# Patient Record
Sex: FEMALE | Race: WHITE | NOT HISPANIC OR LATINO | Employment: UNEMPLOYED | URBAN - NONMETROPOLITAN AREA
[De-identification: names, ages, dates, MRNs, and addresses within clinical notes are randomized per-mention and may not be internally consistent; named-entity substitution may affect disease eponyms.]

---

## 2017-06-05 ENCOUNTER — APPOINTMENT (OUTPATIENT)
Dept: PREADMISSION TESTING | Facility: HOSPITAL | Age: 72
End: 2017-06-05

## 2017-06-05 RX ORDER — ROPINIROLE 1 MG/1
3 TABLET, FILM COATED ORAL NIGHTLY
COMMUNITY
End: 2017-08-09 | Stop reason: SDUPTHER

## 2017-06-05 RX ORDER — LISINOPRIL 20 MG/1
20 TABLET ORAL DAILY
COMMUNITY

## 2017-06-05 RX ORDER — HYDROCHLOROTHIAZIDE 12.5 MG/1
12.5 CAPSULE, GELATIN COATED ORAL DAILY
COMMUNITY

## 2017-06-06 ENCOUNTER — HOSPITAL ENCOUNTER (INPATIENT)
Facility: HOSPITAL | Age: 72
LOS: 1 days | Discharge: HOME | End: 2017-06-08
Attending: SURGERY | Admitting: SURGERY

## 2017-06-06 ENCOUNTER — ANESTHESIA (OUTPATIENT)
Dept: PERIOP | Facility: HOSPITAL | Age: 72
End: 2017-06-06

## 2017-06-06 ENCOUNTER — ANESTHESIA EVENT (OUTPATIENT)
Dept: PERIOP | Facility: HOSPITAL | Age: 72
End: 2017-06-06

## 2017-06-06 PROBLEM — K42.9 UMBILICAL HERNIA: Status: ACTIVE | Noted: 2017-06-06

## 2017-06-06 PROCEDURE — A9270 NON-COVERED ITEM OR SERVICE: HCPCS | Performed by: SURGERY

## 2017-06-06 PROCEDURE — 25010000002 DEXAMETHASONE PER 1 MG: Performed by: NURSE ANESTHETIST, CERTIFIED REGISTERED

## 2017-06-06 PROCEDURE — 63710000001 ROPINIROLE 1 MG TABLET: Performed by: SURGERY

## 2017-06-06 PROCEDURE — 63710000001 CALCIUM-VITAMIN D 500-200 MG-UNIT TABLET: Performed by: SURGERY

## 2017-06-06 PROCEDURE — 63710000001 HYDROCODONE-ACETAMINOPHEN 7.5-325 MG TABLET: Performed by: SURGERY

## 2017-06-06 PROCEDURE — 25010000002 MIDAZOLAM PER 1 MG: Performed by: NURSE ANESTHETIST, CERTIFIED REGISTERED

## 2017-06-06 PROCEDURE — 63710000001 GABAPENTIN 300 MG CAPSULE: Performed by: SURGERY

## 2017-06-06 PROCEDURE — 25010000002 ONDANSETRON PER 1 MG: Performed by: NURSE ANESTHETIST, CERTIFIED REGISTERED

## 2017-06-06 PROCEDURE — 0WUF4JZ SUPPLEMENT ABDOMINAL WALL WITH SYNTHETIC SUBSTITUTE, PERCUTANEOUS ENDOSCOPIC APPROACH: ICD-10-PCS | Performed by: SURGERY

## 2017-06-06 PROCEDURE — 90732 PPSV23 VACC 2 YRS+ SUBQ/IM: CPT | Performed by: SURGERY

## 2017-06-06 PROCEDURE — C1781 MESH (IMPLANTABLE): HCPCS | Performed by: SURGERY

## 2017-06-06 PROCEDURE — 63710000001 ALPRAZOLAM 0.5 MG TABLET: Performed by: SURGERY

## 2017-06-06 PROCEDURE — 25010000002 PNEUMOCOCCAL VAC POLYVALENT PER 0.5 ML: Performed by: SURGERY

## 2017-06-06 PROCEDURE — 25010000002 PROPOFOL 10 MG/ML EMULSION: Performed by: NURSE ANESTHETIST, CERTIFIED REGISTERED

## 2017-06-06 PROCEDURE — 25010000002 NEOSTIGMINE 10 MG/10ML SOLUTION: Performed by: NURSE ANESTHETIST, CERTIFIED REGISTERED

## 2017-06-06 PROCEDURE — 63710000001 PANTOPRAZOLE 40 MG TABLET DELAYED-RELEASE: Performed by: SURGERY

## 2017-06-06 PROCEDURE — 63710000001 LISINOPRIL 10 MG TABLET: Performed by: SURGERY

## 2017-06-06 PROCEDURE — 25010000002 FENTANYL CITRATE (PF) 100 MCG/2ML SOLUTION: Performed by: NURSE ANESTHETIST, CERTIFIED REGISTERED

## 2017-06-06 PROCEDURE — 63710000001 VITAMIN B-12 1000 MCG TABLET: Performed by: SURGERY

## 2017-06-06 PROCEDURE — G0009 ADMIN PNEUMOCOCCAL VACCINE: HCPCS | Performed by: SURGERY

## 2017-06-06 PROCEDURE — 25010000002 MEPERIDINE PER 100 MG: Performed by: NURSE ANESTHETIST, CERTIFIED REGISTERED

## 2017-06-06 PROCEDURE — 63710000001 HYDROCHLOROTHIAZIDE 12.5 MG CAPSULE: Performed by: SURGERY

## 2017-06-06 DEVICE — VENTRALIGHT ST MESH
Type: IMPLANTABLE DEVICE | Status: FUNCTIONAL
Brand: VENTRALIGHT ST

## 2017-06-06 RX ORDER — BUPIVACAINE HYDROCHLORIDE AND EPINEPHRINE 5; 5 MG/ML; UG/ML
INJECTION, SOLUTION PERINEURAL AS NEEDED
Status: DISCONTINUED | OUTPATIENT
Start: 2017-06-06 | End: 2017-06-06 | Stop reason: HOSPADM

## 2017-06-06 RX ORDER — OXYCODONE HYDROCHLORIDE AND ACETAMINOPHEN 5; 325 MG/1; MG/1
1 TABLET ORAL ONCE AS NEEDED
Status: DISCONTINUED | OUTPATIENT
Start: 2017-06-06 | End: 2017-06-06 | Stop reason: HOSPADM

## 2017-06-06 RX ORDER — ONDANSETRON 4 MG/1
4 TABLET, FILM COATED ORAL EVERY 6 HOURS PRN
Status: DISCONTINUED | OUTPATIENT
Start: 2017-06-06 | End: 2017-06-08 | Stop reason: HOSPADM

## 2017-06-06 RX ORDER — MIDAZOLAM HYDROCHLORIDE 1 MG/ML
1 INJECTION INTRAMUSCULAR; INTRAVENOUS
Status: DISCONTINUED | OUTPATIENT
Start: 2017-06-06 | End: 2017-06-06 | Stop reason: HOSPADM

## 2017-06-06 RX ORDER — PRAVASTATIN SODIUM 20 MG
40 TABLET ORAL EVERY OTHER DAY
Status: DISCONTINUED | OUTPATIENT
Start: 2017-06-07 | End: 2017-06-07 | Stop reason: CLARIF

## 2017-06-06 RX ORDER — HYDROCHLOROTHIAZIDE 12.5 MG/1
12.5 CAPSULE, GELATIN COATED ORAL DAILY
Status: DISCONTINUED | OUTPATIENT
Start: 2017-06-06 | End: 2017-06-08 | Stop reason: HOSPADM

## 2017-06-06 RX ORDER — SODIUM CHLORIDE 0.9 % (FLUSH) 0.9 %
1-10 SYRINGE (ML) INJECTION AS NEEDED
Status: DISCONTINUED | OUTPATIENT
Start: 2017-06-06 | End: 2017-06-06 | Stop reason: HOSPADM

## 2017-06-06 RX ORDER — SODIUM CHLORIDE 9 MG/ML
INJECTION, SOLUTION INTRAVENOUS AS NEEDED
Status: DISCONTINUED | OUTPATIENT
Start: 2017-06-06 | End: 2017-06-06 | Stop reason: HOSPADM

## 2017-06-06 RX ORDER — MEPERIDINE HYDROCHLORIDE 25 MG/ML
12.5 INJECTION INTRAMUSCULAR; INTRAVENOUS; SUBCUTANEOUS
Status: DISCONTINUED | OUTPATIENT
Start: 2017-06-06 | End: 2017-06-06 | Stop reason: HOSPADM

## 2017-06-06 RX ORDER — FENTANYL CITRATE 50 UG/ML
50 INJECTION, SOLUTION INTRAMUSCULAR; INTRAVENOUS
Status: DISCONTINUED | OUTPATIENT
Start: 2017-06-06 | End: 2017-06-06 | Stop reason: HOSPADM

## 2017-06-06 RX ORDER — HYDROCODONE BITARTRATE AND ACETAMINOPHEN 10; 325 MG/1; MG/1
1 TABLET ORAL EVERY 6 HOURS PRN
Status: DISCONTINUED | OUTPATIENT
Start: 2017-06-06 | End: 2017-06-06 | Stop reason: SDUPTHER

## 2017-06-06 RX ORDER — LIDOCAINE HYDROCHLORIDE 20 MG/ML
INJECTION, SOLUTION INFILTRATION; PERINEURAL AS NEEDED
Status: DISCONTINUED | OUTPATIENT
Start: 2017-06-06 | End: 2017-06-06 | Stop reason: SURG

## 2017-06-06 RX ORDER — FENTANYL CITRATE 50 UG/ML
INJECTION, SOLUTION INTRAMUSCULAR; INTRAVENOUS AS NEEDED
Status: DISCONTINUED | OUTPATIENT
Start: 2017-06-06 | End: 2017-06-06 | Stop reason: SURG

## 2017-06-06 RX ORDER — CHOLECALCIFEROL (VITAMIN D3) 125 MCG
500 CAPSULE ORAL 2 TIMES DAILY
COMMUNITY

## 2017-06-06 RX ORDER — LISINOPRIL 10 MG/1
20 TABLET ORAL DAILY
Status: DISCONTINUED | OUTPATIENT
Start: 2017-06-06 | End: 2017-06-08 | Stop reason: HOSPADM

## 2017-06-06 RX ORDER — ONDANSETRON 2 MG/ML
4 INJECTION INTRAMUSCULAR; INTRAVENOUS EVERY 6 HOURS PRN
Status: DISCONTINUED | OUTPATIENT
Start: 2017-06-06 | End: 2017-06-08 | Stop reason: HOSPADM

## 2017-06-06 RX ORDER — FAMOTIDINE 20 MG/1
20 TABLET, FILM COATED ORAL 2 TIMES DAILY
Status: DISCONTINUED | OUTPATIENT
Start: 2017-06-06 | End: 2017-06-06 | Stop reason: SDUPTHER

## 2017-06-06 RX ORDER — PANTOPRAZOLE SODIUM 40 MG/1
40 TABLET, DELAYED RELEASE ORAL DAILY
Status: DISCONTINUED | OUTPATIENT
Start: 2017-06-06 | End: 2017-06-08 | Stop reason: HOSPADM

## 2017-06-06 RX ORDER — NEOSTIGMINE METHYLSULFATE 1 MG/ML
INJECTION, SOLUTION INTRAVENOUS AS NEEDED
Status: DISCONTINUED | OUTPATIENT
Start: 2017-06-06 | End: 2017-06-06 | Stop reason: SURG

## 2017-06-06 RX ORDER — ONDANSETRON 4 MG/1
4 TABLET, ORALLY DISINTEGRATING ORAL EVERY 6 HOURS PRN
Status: DISCONTINUED | OUTPATIENT
Start: 2017-06-06 | End: 2017-06-08 | Stop reason: HOSPADM

## 2017-06-06 RX ORDER — GABAPENTIN 300 MG/1
600 CAPSULE ORAL NIGHTLY
Status: DISCONTINUED | OUTPATIENT
Start: 2017-06-06 | End: 2017-06-08 | Stop reason: HOSPADM

## 2017-06-06 RX ORDER — HYDROCODONE BITARTRATE AND ACETAMINOPHEN 7.5; 325 MG/1; MG/1
1 TABLET ORAL EVERY 4 HOURS PRN
Status: DISCONTINUED | OUTPATIENT
Start: 2017-06-06 | End: 2017-06-08 | Stop reason: HOSPADM

## 2017-06-06 RX ORDER — DEXAMETHASONE SODIUM PHOSPHATE 4 MG/ML
INJECTION, SOLUTION INTRA-ARTICULAR; INTRALESIONAL; INTRAMUSCULAR; INTRAVENOUS; SOFT TISSUE AS NEEDED
Status: DISCONTINUED | OUTPATIENT
Start: 2017-06-06 | End: 2017-06-06 | Stop reason: SURG

## 2017-06-06 RX ORDER — ONDANSETRON 2 MG/ML
4 INJECTION INTRAMUSCULAR; INTRAVENOUS ONCE AS NEEDED
Status: DISCONTINUED | OUTPATIENT
Start: 2017-06-06 | End: 2017-06-06 | Stop reason: HOSPADM

## 2017-06-06 RX ORDER — ROPINIROLE 1 MG/1
3 TABLET, FILM COATED ORAL NIGHTLY
Status: DISCONTINUED | OUTPATIENT
Start: 2017-06-06 | End: 2017-06-08 | Stop reason: HOSPADM

## 2017-06-06 RX ORDER — FAMOTIDINE 10 MG/ML
INJECTION, SOLUTION INTRAVENOUS AS NEEDED
Status: DISCONTINUED | OUTPATIENT
Start: 2017-06-06 | End: 2017-06-06 | Stop reason: SURG

## 2017-06-06 RX ORDER — NALOXONE HCL 0.4 MG/ML
0.1 VIAL (ML) INJECTION
Status: DISCONTINUED | OUTPATIENT
Start: 2017-06-06 | End: 2017-06-08 | Stop reason: HOSPADM

## 2017-06-06 RX ORDER — SODIUM CHLORIDE 9 MG/ML
100 INJECTION, SOLUTION INTRAVENOUS CONTINUOUS
Status: DISCONTINUED | OUTPATIENT
Start: 2017-06-06 | End: 2017-06-08 | Stop reason: HOSPADM

## 2017-06-06 RX ORDER — PRAVASTATIN SODIUM 40 MG
40 TABLET ORAL EVERY OTHER DAY
COMMUNITY

## 2017-06-06 RX ORDER — HYDROMORPHONE HYDROCHLORIDE 1 MG/ML
0.5 INJECTION, SOLUTION INTRAMUSCULAR; INTRAVENOUS; SUBCUTANEOUS
Status: DISCONTINUED | OUTPATIENT
Start: 2017-06-06 | End: 2017-06-08 | Stop reason: HOSPADM

## 2017-06-06 RX ORDER — GABAPENTIN 300 MG/1
300 CAPSULE ORAL EVERY MORNING
Status: DISCONTINUED | OUTPATIENT
Start: 2017-06-06 | End: 2017-06-08 | Stop reason: HOSPADM

## 2017-06-06 RX ORDER — MIDAZOLAM HYDROCHLORIDE 1 MG/ML
INJECTION INTRAMUSCULAR; INTRAVENOUS AS NEEDED
Status: DISCONTINUED | OUTPATIENT
Start: 2017-06-06 | End: 2017-06-06 | Stop reason: SURG

## 2017-06-06 RX ORDER — MIDAZOLAM HYDROCHLORIDE 1 MG/ML
2 INJECTION INTRAMUSCULAR; INTRAVENOUS
Status: DISCONTINUED | OUTPATIENT
Start: 2017-06-06 | End: 2017-06-06 | Stop reason: HOSPADM

## 2017-06-06 RX ORDER — CHLORAL HYDRATE 500 MG
1000 CAPSULE ORAL 2 TIMES DAILY WITH MEALS
COMMUNITY

## 2017-06-06 RX ORDER — ALPRAZOLAM 0.5 MG/1
0.5 TABLET ORAL NIGHTLY PRN
Status: DISCONTINUED | OUTPATIENT
Start: 2017-06-06 | End: 2017-06-08 | Stop reason: HOSPADM

## 2017-06-06 RX ORDER — ROCURONIUM BROMIDE 10 MG/ML
INJECTION, SOLUTION INTRAVENOUS AS NEEDED
Status: DISCONTINUED | OUTPATIENT
Start: 2017-06-06 | End: 2017-06-06 | Stop reason: SURG

## 2017-06-06 RX ORDER — SODIUM CHLORIDE, SODIUM LACTATE, POTASSIUM CHLORIDE, CALCIUM CHLORIDE 600; 310; 30; 20 MG/100ML; MG/100ML; MG/100ML; MG/100ML
125 INJECTION, SOLUTION INTRAVENOUS CONTINUOUS
Status: DISCONTINUED | OUTPATIENT
Start: 2017-06-06 | End: 2017-06-06

## 2017-06-06 RX ORDER — IPRATROPIUM BROMIDE AND ALBUTEROL SULFATE 2.5; .5 MG/3ML; MG/3ML
3 SOLUTION RESPIRATORY (INHALATION) ONCE AS NEEDED
Status: DISCONTINUED | OUTPATIENT
Start: 2017-06-06 | End: 2017-06-06 | Stop reason: HOSPADM

## 2017-06-06 RX ORDER — ROPINIROLE 1 MG/1
2 TABLET, FILM COATED ORAL EVERY MORNING
Status: DISCONTINUED | OUTPATIENT
Start: 2017-06-07 | End: 2017-06-08 | Stop reason: HOSPADM

## 2017-06-06 RX ORDER — ONDANSETRON 2 MG/ML
INJECTION INTRAMUSCULAR; INTRAVENOUS AS NEEDED
Status: DISCONTINUED | OUTPATIENT
Start: 2017-06-06 | End: 2017-06-06 | Stop reason: SURG

## 2017-06-06 RX ORDER — LANOLIN ALCOHOL/MO/W.PET/CERES
500 CREAM (GRAM) TOPICAL
Status: DISCONTINUED | OUTPATIENT
Start: 2017-06-06 | End: 2017-06-08 | Stop reason: HOSPADM

## 2017-06-06 RX ORDER — LANOLIN ALCOHOL/MO/W.PET/CERES
1000 CREAM (GRAM) TOPICAL 2 TIMES DAILY
Status: ON HOLD | COMMUNITY
End: 2017-06-06

## 2017-06-06 RX ORDER — GLYCOPYRROLATE 0.2 MG/ML
INJECTION INTRAMUSCULAR; INTRAVENOUS AS NEEDED
Status: DISCONTINUED | OUTPATIENT
Start: 2017-06-06 | End: 2017-06-06 | Stop reason: SURG

## 2017-06-06 RX ORDER — HYDROCODONE BITARTRATE AND ACETAMINOPHEN 7.5; 325 MG/1; MG/1
1 TABLET ORAL EVERY 6 HOURS PRN
Qty: 24 TABLET | Refills: 0 | Status: SHIPPED | OUTPATIENT
Start: 2017-06-06 | End: 2017-06-08

## 2017-06-06 RX ORDER — GABAPENTIN 300 MG/1
300 CAPSULE ORAL EVERY MORNING
COMMUNITY
End: 2017-08-09 | Stop reason: SDUPTHER

## 2017-06-06 RX ORDER — PROPOFOL 10 MG/ML
VIAL (ML) INTRAVENOUS AS NEEDED
Status: DISCONTINUED | OUTPATIENT
Start: 2017-06-06 | End: 2017-06-06 | Stop reason: SURG

## 2017-06-06 RX ORDER — HEPARIN SODIUM 5000 [USP'U]/ML
5000 INJECTION, SOLUTION INTRAVENOUS; SUBCUTANEOUS EVERY 8 HOURS SCHEDULED
Status: DISCONTINUED | OUTPATIENT
Start: 2017-06-07 | End: 2017-06-08 | Stop reason: HOSPADM

## 2017-06-06 RX ADMIN — SODIUM CHLORIDE, POTASSIUM CHLORIDE, SODIUM LACTATE AND CALCIUM CHLORIDE 125 ML/HR: 600; 310; 30; 20 INJECTION, SOLUTION INTRAVENOUS at 08:11

## 2017-06-06 RX ADMIN — LISINOPRIL 20 MG: 10 TABLET ORAL at 14:41

## 2017-06-06 RX ADMIN — ALPRAZOLAM 0.5 MG: 0.5 TABLET ORAL at 21:51

## 2017-06-06 RX ADMIN — MIDAZOLAM HYDROCHLORIDE 2 MG: 1 INJECTION, SOLUTION INTRAMUSCULAR; INTRAVENOUS at 08:53

## 2017-06-06 RX ADMIN — FENTANYL CITRATE 100 MCG: 50 INJECTION INTRAMUSCULAR; INTRAVENOUS at 09:18

## 2017-06-06 RX ADMIN — CYANOCOBALAMIN TAB 1000 MCG 500 MCG: 1000 TAB at 14:40

## 2017-06-06 RX ADMIN — ROPINIROLE HYDROCHLORIDE 3 MG: 1 TABLET, FILM COATED ORAL at 20:21

## 2017-06-06 RX ADMIN — HYDROCHLOROTHIAZIDE 12.5 MG: 12.5 CAPSULE, GELATIN COATED ORAL at 14:41

## 2017-06-06 RX ADMIN — GABAPENTIN 600 MG: 300 CAPSULE ORAL at 20:21

## 2017-06-06 RX ADMIN — FENTANYL CITRATE 50 MCG: 50 INJECTION, SOLUTION INTRAMUSCULAR; INTRAVENOUS at 10:02

## 2017-06-06 RX ADMIN — CEFAZOLIN 1 G: 1 INJECTION, POWDER, FOR SOLUTION INTRAMUSCULAR; INTRAVENOUS; PARENTERAL at 09:07

## 2017-06-06 RX ADMIN — FENTANYL CITRATE 50 MCG: 50 INJECTION INTRAMUSCULAR; INTRAVENOUS at 09:37

## 2017-06-06 RX ADMIN — CALCIUM CARBONATE-VITAMIN D TAB 500 MG-200 UNIT 500 MG: 500-200 TAB at 14:39

## 2017-06-06 RX ADMIN — GABAPENTIN 300 MG: 300 CAPSULE ORAL at 14:42

## 2017-06-06 RX ADMIN — ROCURONIUM BROMIDE 25 MG: 10 INJECTION INTRAVENOUS at 08:58

## 2017-06-06 RX ADMIN — ROCURONIUM BROMIDE 5 MG: 10 INJECTION INTRAVENOUS at 08:57

## 2017-06-06 RX ADMIN — ONDANSETRON 4 MG: 2 INJECTION, SOLUTION INTRAMUSCULAR; INTRAVENOUS at 08:53

## 2017-06-06 RX ADMIN — DEXAMETHASONE SODIUM PHOSPHATE 4 MG: 4 INJECTION, SOLUTION INTRAMUSCULAR; INTRAVENOUS at 08:53

## 2017-06-06 RX ADMIN — HYDROCODONE BITARTRATE AND ACETAMINOPHEN 1 TABLET: 7.5; 325 TABLET ORAL at 14:53

## 2017-06-06 RX ADMIN — LIDOCAINE HYDROCHLORIDE 60 MG: 20 INJECTION, SOLUTION INFILTRATION; PERINEURAL at 08:57

## 2017-06-06 RX ADMIN — FAMOTIDINE 20 MG: 10 INJECTION, SOLUTION INTRAVENOUS at 08:53

## 2017-06-06 RX ADMIN — NEOSTIGMINE METHYLSULFATE 5 MG: 1 INJECTION, SOLUTION INTRAVENOUS at 09:33

## 2017-06-06 RX ADMIN — PROPOFOL 150 MG: 10 INJECTION, EMULSION INTRAVENOUS at 08:57

## 2017-06-06 RX ADMIN — HYDROCODONE BITARTRATE AND ACETAMINOPHEN 1 TABLET: 7.5; 325 TABLET ORAL at 21:51

## 2017-06-06 RX ADMIN — PANTOPRAZOLE SODIUM 40 MG: 40 TABLET, DELAYED RELEASE ORAL at 14:41

## 2017-06-06 RX ADMIN — FENTANYL CITRATE 50 MCG: 50 INJECTION, SOLUTION INTRAMUSCULAR; INTRAVENOUS at 09:56

## 2017-06-06 RX ADMIN — MEPERIDINE HYDROCHLORIDE 12.5 MG: 25 INJECTION, SOLUTION INTRAMUSCULAR; INTRAVENOUS; SUBCUTANEOUS at 10:06

## 2017-06-06 RX ADMIN — FENTANYL CITRATE 100 MCG: 50 INJECTION INTRAMUSCULAR; INTRAVENOUS at 08:57

## 2017-06-06 RX ADMIN — GLYCOPYRROLATE 0.8 MG: 0.2 INJECTION, SOLUTION INTRAMUSCULAR; INTRAVENOUS at 09:33

## 2017-06-06 RX ADMIN — SODIUM CHLORIDE 100 ML/HR: 900 INJECTION, SOLUTION INTRAVENOUS at 12:38

## 2017-06-06 RX ADMIN — PNEUMOCOCCAL VACCINE POLYVALENT 0.5 ML
25; 25; 25; 25; 25; 25; 25; 25; 25; 25; 25; 25; 25; 25; 25; 25; 25; 25; 25; 25; 25; 25; 25 INJECTION, SOLUTION INTRAMUSCULAR; SUBCUTANEOUS at 17:30

## 2017-06-06 RX ADMIN — SODIUM CHLORIDE 100 ML/HR: 900 INJECTION, SOLUTION INTRAVENOUS at 21:55

## 2017-06-06 NOTE — ANESTHESIA PROCEDURE NOTES
Airway  Urgency: elective    Airway not difficult    General Information and Staff    Patient location during procedure: OR  CRNA: YARON VEGA    Indications and Patient Condition  Indications for airway management: airway protection    Preoxygenated: yes  MILS maintained throughout  Mask difficulty assessment: 1 - vent by mask    Final Airway Details  Final airway type: endotracheal airway      Successful airway: ETT  Cuffed: yes   Successful intubation technique: direct laryngoscopy  Facilitating devices/methods: intubating stylet  Endotracheal tube insertion site: oral  Blade: Hannah  Blade size: #3  ETT size: 7.0 mm  Cormack-Lehane Classification: grade I - full view of glottis  Placement verified by: chest auscultation and capnometry   Measured from: lips  ETT to lips (cm): 20  Number of attempts at approach: 1    Additional Comments  ETT inserted without difficulty.  Head and neck maintained midline.  LIps and gums as per preop.

## 2017-06-06 NOTE — ANESTHESIA POSTPROCEDURE EVALUATION
Patient: Layne Case    Procedure Summary     Date Anesthesia Start Anesthesia Stop Room / Location    06/06/17 0853 0947  [facility] OR 01 / [facility]       Procedure Diagnosis Surgeon Provider    VENTRAL/INCISIONAL HERNIA REPAIR LAPAROSCOPIC WITH PATCH (N/A Abdomen) (K43.9) MD Tanmay Rangel MD          Anesthesia Type: general  Last vitals  BP      Temp      Pulse     Resp      SpO2        Post Anesthesia Care and Evaluation    Patient location during evaluation: PACU  Patient participation: complete - patient participated  Level of consciousness: awake and alert  Pain score: 1  Pain management: adequate  Airway patency: patent  Anesthetic complications: No anesthetic complications  PONV Status: controlled  Cardiovascular status: acceptable  Respiratory status: acceptable  Hydration status: acceptable

## 2017-06-06 NOTE — ANESTHESIA PREPROCEDURE EVALUATION
Anesthesia Evaluation     NPO Solid Status: > 8 hours  NPO Liquid Status: > 8 hours     Airway   Mallampati: II  TM distance: >3 FB  Neck ROM: full  no difficulty expected  Dental    (+) lower dentures and upper dentures    Pulmonary - normal exam   Cardiovascular - normal exam    (+) hypertension,       Neuro/Psych  GI/Hepatic/Renal/Endo      Musculoskeletal     Abdominal  - normal exam   Substance History      OB/GYN          Other                                        Anesthesia Plan    ASA 2     general     intravenous induction   Anesthetic plan and risks discussed with patient.

## 2017-06-07 PROBLEM — K42.9 UMBILICAL HERNIA WITHOUT OBSTRUCTION OR GANGRENE: Status: ACTIVE | Noted: 2017-06-07

## 2017-06-07 LAB
ANION GAP SERPL CALCULATED.3IONS-SCNC: 1.3 MMOL/L (ref 3.6–11.2)
BASOPHILS # BLD AUTO: 0.01 10*3/MM3 (ref 0–0.3)
BASOPHILS NFR BLD AUTO: 0.1 % (ref 0–2)
BUN BLD-MCNC: 10 MG/DL (ref 7–21)
BUN/CREAT SERPL: 11.6 (ref 7–25)
CALCIUM SPEC-SCNC: 8.8 MG/DL (ref 7.7–10)
CHLORIDE SERPL-SCNC: 111 MMOL/L (ref 99–112)
CO2 SERPL-SCNC: 26.7 MMOL/L (ref 24.3–31.9)
CREAT BLD-MCNC: 0.86 MG/DL (ref 0.43–1.29)
DEPRECATED RDW RBC AUTO: 47.9 FL (ref 37–54)
EOSINOPHIL # BLD AUTO: 0.01 10*3/MM3 (ref 0–0.7)
EOSINOPHIL NFR BLD AUTO: 0.1 % (ref 0–7)
ERYTHROCYTE [DISTWIDTH] IN BLOOD BY AUTOMATED COUNT: 15.6 % (ref 11.5–14.5)
GFR SERPL CREATININE-BSD FRML MDRD: 65 ML/MIN/1.73
GLUCOSE BLD-MCNC: 107 MG/DL (ref 70–110)
HCT VFR BLD AUTO: 29.9 % (ref 37–47)
HGB BLD-MCNC: 8.8 G/DL (ref 12–16)
IMM GRANULOCYTES # BLD: 0.01 10*3/MM3 (ref 0–0.03)
IMM GRANULOCYTES NFR BLD: 0.1 % (ref 0–0.5)
LYMPHOCYTES # BLD AUTO: 1.03 10*3/MM3 (ref 1–3)
LYMPHOCYTES NFR BLD AUTO: 13.7 % (ref 16–46)
MCH RBC QN AUTO: 25 PG (ref 27–33)
MCHC RBC AUTO-ENTMCNC: 29.4 G/DL (ref 33–37)
MCV RBC AUTO: 84.9 FL (ref 80–94)
MONOCYTES # BLD AUTO: 0.53 10*3/MM3 (ref 0.1–0.9)
MONOCYTES NFR BLD AUTO: 7 % (ref 0–12)
NEUTROPHILS # BLD AUTO: 5.93 10*3/MM3 (ref 1.4–6.5)
NEUTROPHILS NFR BLD AUTO: 79 % (ref 40–75)
OSMOLALITY SERPL CALC.SUM OF ELEC: 277.1 MOSM/KG (ref 273–305)
PLATELET # BLD AUTO: 250 10*3/MM3 (ref 130–400)
PMV BLD AUTO: 10.3 FL (ref 6–10)
POTASSIUM BLD-SCNC: 3.7 MMOL/L (ref 3.5–5.3)
RBC # BLD AUTO: 3.52 10*6/MM3 (ref 4.2–5.4)
SODIUM BLD-SCNC: 139 MMOL/L (ref 135–153)
WBC NRBC COR # BLD: 7.52 10*3/MM3 (ref 4.5–12.5)

## 2017-06-07 PROCEDURE — 63710000001 GABAPENTIN 300 MG CAPSULE: Performed by: SURGERY

## 2017-06-07 PROCEDURE — 63710000001 HYDROCODONE-ACETAMINOPHEN 7.5-325 MG TABLET: Performed by: SURGERY

## 2017-06-07 PROCEDURE — A9270 NON-COVERED ITEM OR SERVICE: HCPCS | Performed by: SURGERY

## 2017-06-07 PROCEDURE — 63710000001 PANTOPRAZOLE 40 MG TABLET DELAYED-RELEASE: Performed by: SURGERY

## 2017-06-07 PROCEDURE — 80048 BASIC METABOLIC PNL TOTAL CA: CPT | Performed by: SURGERY

## 2017-06-07 PROCEDURE — 63710000001 CALCIUM-VITAMIN D 500-200 MG-UNIT TABLET: Performed by: SURGERY

## 2017-06-07 PROCEDURE — 63710000001 PRAVASTATIN 20 MG TABLET: Performed by: SURGERY

## 2017-06-07 PROCEDURE — 85025 COMPLETE CBC W/AUTO DIFF WBC: CPT | Performed by: SURGERY

## 2017-06-07 PROCEDURE — 63710000001 ROPINIROLE 1 MG TABLET: Performed by: SURGERY

## 2017-06-07 PROCEDURE — 63710000001 VITAMIN B-12 1000 MCG TABLET: Performed by: SURGERY

## 2017-06-07 PROCEDURE — 25010000002 HEPARIN (PORCINE) PER 1000 UNITS: Performed by: SURGERY

## 2017-06-07 RX ORDER — ATORVASTATIN CALCIUM 10 MG/1
10 TABLET, FILM COATED ORAL EVERY OTHER DAY
Status: DISCONTINUED | OUTPATIENT
Start: 2017-06-09 | End: 2017-06-08 | Stop reason: HOSPADM

## 2017-06-07 RX ADMIN — HYDROCODONE BITARTRATE AND ACETAMINOPHEN 1 TABLET: 7.5; 325 TABLET ORAL at 12:08

## 2017-06-07 RX ADMIN — GABAPENTIN 600 MG: 300 CAPSULE ORAL at 20:19

## 2017-06-07 RX ADMIN — SODIUM CHLORIDE 100 ML/HR: 900 INJECTION, SOLUTION INTRAVENOUS at 08:09

## 2017-06-07 RX ADMIN — CYANOCOBALAMIN TAB 1000 MCG 500 MCG: 1000 TAB at 12:03

## 2017-06-07 RX ADMIN — HEPARIN SODIUM 5000 UNITS: 5000 INJECTION, SOLUTION INTRAVENOUS; SUBCUTANEOUS at 16:47

## 2017-06-07 RX ADMIN — ROPINIROLE HYDROCHLORIDE 2 MG: 1 TABLET, FILM COATED ORAL at 06:01

## 2017-06-07 RX ADMIN — CALCIUM CARBONATE-VITAMIN D TAB 500 MG-200 UNIT 500 MG: 500-200 TAB at 08:09

## 2017-06-07 RX ADMIN — PANTOPRAZOLE SODIUM 40 MG: 40 TABLET, DELAYED RELEASE ORAL at 08:09

## 2017-06-07 RX ADMIN — ALPRAZOLAM 0.5 MG: 0.5 TABLET ORAL at 22:52

## 2017-06-07 RX ADMIN — GABAPENTIN 300 MG: 300 CAPSULE ORAL at 06:01

## 2017-06-07 RX ADMIN — HEPARIN SODIUM 5000 UNITS: 5000 INJECTION, SOLUTION INTRAVENOUS; SUBCUTANEOUS at 22:52

## 2017-06-07 RX ADMIN — ROPINIROLE HYDROCHLORIDE 3 MG: 1 TABLET, FILM COATED ORAL at 20:19

## 2017-06-07 RX ADMIN — CALCIUM CARBONATE-VITAMIN D TAB 500 MG-200 UNIT 500 MG: 500-200 TAB at 18:36

## 2017-06-07 RX ADMIN — PRAVASTATIN SODIUM 40 MG: 20 TABLET ORAL at 08:08

## 2017-06-07 RX ADMIN — HEPARIN SODIUM 5000 UNITS: 5000 INJECTION, SOLUTION INTRAVENOUS; SUBCUTANEOUS at 06:01

## 2017-06-07 RX ADMIN — SODIUM CHLORIDE 100 ML/HR: 900 INJECTION, SOLUTION INTRAVENOUS at 18:36

## 2017-06-08 VITALS
TEMPERATURE: 98.1 F | BODY MASS INDEX: 35.44 KG/M2 | HEIGHT: 63 IN | HEART RATE: 76 BPM | SYSTOLIC BLOOD PRESSURE: 141 MMHG | DIASTOLIC BLOOD PRESSURE: 69 MMHG | OXYGEN SATURATION: 93 % | WEIGHT: 200 LBS | RESPIRATION RATE: 20 BRPM

## 2017-06-08 PROCEDURE — 25010000002 HEPARIN (PORCINE) PER 1000 UNITS: Performed by: SURGERY

## 2017-06-08 RX ADMIN — PANTOPRAZOLE SODIUM 40 MG: 40 TABLET, DELAYED RELEASE ORAL at 08:18

## 2017-06-08 RX ADMIN — HEPARIN SODIUM 5000 UNITS: 5000 INJECTION, SOLUTION INTRAVENOUS; SUBCUTANEOUS at 06:22

## 2017-06-08 RX ADMIN — HYDROCHLOROTHIAZIDE 12.5 MG: 12.5 CAPSULE, GELATIN COATED ORAL at 08:18

## 2017-06-08 RX ADMIN — ROPINIROLE HYDROCHLORIDE 2 MG: 1 TABLET, FILM COATED ORAL at 06:22

## 2017-06-08 RX ADMIN — CALCIUM CARBONATE-VITAMIN D TAB 500 MG-200 UNIT 500 MG: 500-200 TAB at 08:18

## 2017-06-08 RX ADMIN — HYDROCODONE BITARTRATE AND ACETAMINOPHEN 1 TABLET: 7.5; 325 TABLET ORAL at 08:21

## 2017-06-08 RX ADMIN — SODIUM CHLORIDE 100 ML/HR: 900 INJECTION, SOLUTION INTRAVENOUS at 04:50

## 2017-06-08 RX ADMIN — GABAPENTIN 300 MG: 300 CAPSULE ORAL at 06:22

## 2017-06-08 RX ADMIN — LISINOPRIL 20 MG: 10 TABLET ORAL at 08:18

## 2017-07-07 ENCOUNTER — TRANSCRIBE ORDERS (OUTPATIENT)
Dept: ADMINISTRATIVE | Facility: HOSPITAL | Age: 72
End: 2017-07-07

## 2017-07-11 DIAGNOSIS — R92.8 ABNORMAL MAMMOGRAM: Primary | ICD-10-CM

## 2017-08-02 ENCOUNTER — HOSPITAL ENCOUNTER (OUTPATIENT)
Dept: ULTRASOUND IMAGING | Facility: HOSPITAL | Age: 72
Discharge: HOME | End: 2017-08-02

## 2017-08-02 ENCOUNTER — HOSPITAL ENCOUNTER (OUTPATIENT)
Dept: MAMMOGRAPHY | Facility: HOSPITAL | Age: 72
Discharge: HOME | End: 2017-08-02
Attending: SURGERY | Admitting: SURGERY

## 2017-08-02 DIAGNOSIS — R92.8 ABNORMAL MAMMOGRAM OF LEFT BREAST: ICD-10-CM

## 2017-08-02 DIAGNOSIS — R92.8 ABNORMAL MAMMOGRAM: ICD-10-CM

## 2017-08-02 PROCEDURE — G0204 DX MAMMO INCL CAD BI: HCPCS | Performed by: RADIOLOGY

## 2017-08-02 PROCEDURE — G0204 DX MAMMO INCL CAD BI: HCPCS

## 2017-08-02 PROCEDURE — G0279 TOMOSYNTHESIS, MAMMO: HCPCS

## 2017-08-02 PROCEDURE — 76882 US LMTD JT/FCL EVL NVASC XTR: CPT

## 2017-08-02 PROCEDURE — G0279 TOMOSYNTHESIS, MAMMO: HCPCS | Performed by: RADIOLOGY

## 2017-08-02 PROCEDURE — 76882 US LMTD JT/FCL EVL NVASC XTR: CPT | Performed by: RADIOLOGY

## 2017-08-09 ENCOUNTER — OFFICE VISIT (OUTPATIENT)
Dept: SURGERY | Facility: CLINIC | Age: 72
End: 2017-08-09

## 2017-08-09 VITALS
HEART RATE: 73 BPM | SYSTOLIC BLOOD PRESSURE: 142 MMHG | BODY MASS INDEX: 36.32 KG/M2 | HEIGHT: 63 IN | WEIGHT: 205 LBS | DIASTOLIC BLOOD PRESSURE: 78 MMHG

## 2017-08-09 DIAGNOSIS — R92.8 ABNORMAL MAMMOGRAM: Primary | ICD-10-CM

## 2017-08-09 PROCEDURE — 99213 OFFICE O/P EST LOW 20 MIN: CPT | Performed by: SURGERY

## 2017-08-09 NOTE — PROGRESS NOTES
Subjective   Layne Case is a 71 y.o. female is here today for follow-up breast care and review of mammogram.    History of Present Illness  Ms. Case was seen in the office today for follow-up of a left breast asymmetric density.  She also has a history of left breast pain which she states is resolved.  The patient had a bilateral mammogram and a left breast ultrasound on 8/2/17 which demonstrated stable findings with no evidence of malignancy.  The patient denies a lump in her breast.  There is no change in her family history for breast or ovarian cancer.  She denies a history of nipple discharge.  Allergies   Allergen Reactions   • Azithromycin Swelling         Current Outpatient Prescriptions   Medication Sig Dispense Refill   • ALPRAZolam (XANAX) 0.5 MG tablet Take 0.5 mg by mouth At Night As Needed.     • calcium carbonate-vitamin d 600-400 MG-UNIT per tablet Take 1 tablet by mouth 2 (Two) Times a Day.     • gabapentin (NEURONTIN) 300 MG capsule Take 600 mg by mouth Every Night. Take 1 pill in the morning and takes 2 tablets at night     • hydrochlorothiazide (MICROZIDE) 12.5 MG capsule Take 12.5 mg by mouth Daily.     • HYDROcodone-acetaminophen (NORCO)  MG per tablet Take 1 tablet by mouth Every 6 (Six) Hours As Needed.     • lisinopril (PRINIVIL,ZESTRIL) 20 MG tablet Take 20 mg by mouth Daily.     • Omega-3 Fatty Acids (FISH OIL) 1000 MG capsule capsule Take 1,000 mg by mouth 2 (Two) Times a Day With Meals.     • pantoprazole (PROTONIX) 40 MG EC tablet Take 40 mg by mouth Daily.     • pravastatin (PRAVACHOL) 40 MG tablet Take 40 mg by mouth Every Other Day.     • ranitidine (ZANTAC) 150 MG tablet Take 150 mg by mouth 2 (Two) Times a Day.     • rOPINIRole (REQUIP) 1 MG tablet Take 2 mg by mouth Every Morning.     • vitamin B-12 (CYANOCOBALAMIN) 500 MCG tablet Take 500 mcg by mouth 2 (Two) Times a Day.       No current facility-administered medications for this visit.      Past Medical History:  "  Diagnosis Date   • Abnormal mammogram    • Anxiety    • Arthritis    • Bray esophagus    • Hypertension      Past Surgical History:   Procedure Laterality Date   • ENDOSCOPY AND COLONOSCOPY  06/2017   • HYSTERECTOMY     • VENTRAL/INCISIONAL HERNIA REPAIR N/A 6/6/2017    Procedure: VENTRAL/INCISIONAL HERNIA REPAIR LAPAROSCOPIC WITH PATCH;  Surgeon: Jf Gorman MD;  Location: [facility];  Service:      The following portions of the patient's history were reviewed and updated as appropriate: allergies, current medications, past family history, past medical history, past social history, past surgical history and problem list.    Review of Systems  General: negative  Integumentary: negative  Eyes: negative  ENT: negative  Respiratory: negative  Gastrointestinal: constipation  Cardiovascular: negative  Neurological: negative  Psychiatric: negative  Hematologic/Lymphatic: negative  Genitourinary: negative  Musculoskeletal: back pain  Endocrine: negative  Breasts: negative    Objective   /78 (BP Location: Left arm, Patient Position: Sitting)  Pulse 73  Ht 63\" (160 cm)  Wt 205 lb (93 kg)  BMI 36.31 kg/m2   Physical Exam  General:  This is a WD WN overweight white female in no acute distress  Vital signs stable, afebrile  HEENT exam:  WNL. Sclera are anicteric.  EOMI  Neck:  supple, FROM.  No JVD.  Trachea midline  Lungs:  Respiratory effort normal. Auscultation: Clear, without wheezes, rhonchi, rales  Heart:  Regular rate and rhythm, without murmur, gallop, rub.  No pedal edema  Breasts: On visual inspection the breasts are symmetrical. Examination of the right breast demonstrates no discrete mass, skin change.  Is a palpable left axillary lymph node. Examination of the left breast demonstrates no discrete mass, skin change, or axillary adenopathy.  Abdomen: Nontender, without hepatosplenomegaly  Musculoskeletal:  muscle strength/tone is normal.    Psyc:  alert, oriented x 3.  Mood and affect are " appropriate  skin:  Warm with good turgor.  Without rash or lesion  extremities:  Examination of the extremities revealed no cyanosis, clubbing or edema.    Results/Data  Current and previous mammogram films and reports were reviewed and I agree with the assessment.  Ultrasound images of the axilla demonstrates what appears to be stable reactive lymph node    Procedures     Assessment/Plan     Stable left breast asymmetric density  Stable left axillary lymph nodes thought to represent fatty replaced versus benign reactive lymph node    Plan: Patient to resume her annual breast follow-up with Dr. Hull.  I will see her back as needed         Discussion/Summary    Errors in dictation may reflect use of voice recognition software and not all errors in transcription may have been detected prior to signing.    No future appointments.

## 2018-09-12 ENCOUNTER — TRANSCRIBE ORDERS (OUTPATIENT)
Dept: OCCUPATIONAL THERAPY | Facility: HOSPITAL | Age: 73
End: 2018-09-12

## 2018-09-12 DIAGNOSIS — I89.0 LYMPHEDEMA: Primary | ICD-10-CM

## 2018-09-18 ENCOUNTER — HOSPITAL ENCOUNTER (OUTPATIENT)
Dept: MAMMOGRAPHY | Facility: HOSPITAL | Age: 73
Discharge: HOME | End: 2018-09-18
Admitting: INTERNAL MEDICINE

## 2018-09-18 DIAGNOSIS — Z12.39 SCREENING BREAST EXAMINATION: ICD-10-CM

## 2018-09-18 PROCEDURE — 77063 BREAST TOMOSYNTHESIS BI: CPT

## 2018-09-18 PROCEDURE — 77067 SCR MAMMO BI INCL CAD: CPT

## 2018-09-18 PROCEDURE — 77063 BREAST TOMOSYNTHESIS BI: CPT | Performed by: RADIOLOGY

## 2018-09-18 PROCEDURE — 77067 SCR MAMMO BI INCL CAD: CPT | Performed by: RADIOLOGY

## 2018-09-25 ENCOUNTER — HOSPITAL ENCOUNTER (OUTPATIENT)
Dept: ULTRASOUND IMAGING | Facility: HOSPITAL | Age: 73
Discharge: HOME | End: 2018-09-25

## 2018-09-25 ENCOUNTER — HOSPITAL ENCOUNTER (OUTPATIENT)
Dept: MAMMOGRAPHY | Facility: HOSPITAL | Age: 73
Discharge: HOME | End: 2018-09-25
Admitting: RADIOLOGY

## 2018-09-25 DIAGNOSIS — R92.8 ABNORMAL MAMMOGRAM OF LEFT BREAST: ICD-10-CM

## 2018-09-25 PROCEDURE — G0279 TOMOSYNTHESIS, MAMMO: HCPCS

## 2018-09-25 PROCEDURE — 77065 DX MAMMO INCL CAD UNI: CPT | Performed by: RADIOLOGY

## 2018-09-25 PROCEDURE — 77065 DX MAMMO INCL CAD UNI: CPT

## 2018-09-25 PROCEDURE — 76642 ULTRASOUND BREAST LIMITED: CPT

## 2018-09-25 PROCEDURE — G0279 TOMOSYNTHESIS, MAMMO: HCPCS | Performed by: RADIOLOGY

## 2018-09-25 PROCEDURE — 76642 ULTRASOUND BREAST LIMITED: CPT | Performed by: RADIOLOGY

## 2018-09-27 ENCOUNTER — HOSPITAL ENCOUNTER (OUTPATIENT)
Dept: OCCUPATIONAL THERAPY | Facility: HOSPITAL | Age: 73
Setting detail: THERAPIES SERIES
Discharge: HOME | End: 2018-09-27

## 2018-09-27 DIAGNOSIS — I89.0 LYMPHEDEMA OF BOTH LOWER EXTREMITIES: Primary | ICD-10-CM

## 2018-09-27 PROCEDURE — G8988 SELF CARE GOAL STATUS: HCPCS

## 2018-09-27 PROCEDURE — G8987 SELF CARE CURRENT STATUS: HCPCS

## 2018-09-27 PROCEDURE — 97167 OT EVAL HIGH COMPLEX 60 MIN: CPT

## 2018-09-27 NOTE — THERAPY TREATMENT NOTE
Outpatient Occupational Therapy Lymphedema Initial Evaluation   [place]     Patient Name: Layne Case  : 1945  MRN: [id number]  Today's Date: 2018      Visit Date: 2018    Patient Active Problem List   Diagnosis   • Abnormal mammogram   • Umbilical hernia   • Umbilical hernia without obstruction or gangrene        Past Medical History:   Diagnosis Date   • Abnormal mammogram    • Anxiety    • Arthritis    • Bray esophagus    • Hypertension         Past Surgical History:   Procedure Laterality Date   • ENDOSCOPY AND COLONOSCOPY  2017   • HYSTERECTOMY     • VENTRAL/INCISIONAL HERNIA REPAIR N/A 2017    Procedure: VENTRAL/INCISIONAL HERNIA REPAIR LAPAROSCOPIC WITH PATCH;  Surgeon: Jf Gorman MD;  Location: [facility];  Service:          Visit Dx:     ICD-10-CM ICD-9-CM   1. Lymphedema of both lower extremities I89.0 457.1                 Lymphedema     Row Name 18 1400             Subjective Pain    Able to rate subjective pain? yes  -BC      Subjective Pain Comment My worst problem is in my back.  -BC         Subjective Comments    Subjective Comments Top of my leg (L) feels numb and tingly.   -BC         Lymphedema Assessment    Lymphedema Classification RLE:;LLE:  -BC      Lymphedema Assessment Comments No known contraindications.  -BC         Lymphedema Edema Assessment    Ptting Edema Category By grade out of 4  -BC      Pitting Edema + 4/4  -BC         Skin Changes/Observations    Location/Assessment Lower Extremity  -BC      Lower Extremity Conditions bilateral:;intact;clean;dry;hairless  -BC      Lower Extremity Color/Pigment bilateral:;blanchable  -BC         Lymphedema Sensation    Lymphedema Sensation Reports LLE:;numbness;tingling  -BC      Lymphedema Sensation Tests light touch;deep touch  -BC      Lymphedema Light Touch RLE:;LLE:;WNL  -BC      Lymphedema Deep Touch LUE:;RLE:;WNL  -BC         Lymphedema Measurements    Measurement Type(s) Quick Girth  -BC       Quick Girth Areas Lower extremities  -BC         LLE Quick Girth (cm)    Met-heads 24.1 cm  -BC      Mid foot 24.9 cm  -BC      Smallest ankle 22.8 cm  -BC      Largest calf 45 cm  -BC      Tib tuberosity 42.5 cm  -BC      Mid patella 52.5 cm  -BC      Distal thigh 61.5 cm  -BC      Proximal thigh 67 cm  -BC      Other 1 31.8 cm  -BC      Other 2 45.4 cm  -BC         RLE Quick Girth (cm)    Met-heads 24 cm  -BC      Mid foot 23.5 cm  -BC      Smallest ankle 24.4 cm  -BC      Largest calf 43.7 cm  -BC      Tib tuberosity 41.2 cm  -BC      Mid patella 50.2 cm  -BC      Distal thigh 60 cm  -BC      Proximal thigh 68.5 cm  -BC      Other 1 30.4 cm  -BC      Other 2 43 cm  -BC      RLE Quick Girth Total 408.9  -BC        User Key  (r) = Recorded By, (t) = Taken By, (c) = Cosigned By    Initials Name Provider Type    BC Eileen Oh OT Occupational Therapist                                     OT Goals     Row Name 09/27/18 1400          OT Short Term Goals    STG Date to Achieve 11/14/18  -BC     STG 1 Pt. familiar w/precautions, skin care and self management of lymphedema.  -BC     STG 2 Decrease pitting edema to 3/4 for decreased risk of infection.  -BC     STG 3 HEP to assist with improved lymphatic flow.  -BC     STG 4 Self care instructions for home MLD for volume reduction.  -BC        Long Term Goals    LTG Date to Achieve 12/27/18  -BC     LTG 1 Pt. and/or care giver independent w/short stretch compression bandaging for volume reduction.  -BC     LTG 2 Decrease pitting edema to 2/4 for decreased risk of infection.   -BC     LTG 3 Indepndent with donning/doffing compression garment.  -BC     LTG 4 Independent with lymphedema management and HEP.  -BC        Time Calculation    OT Goal Re-Cert Due Date 12/27/18  -BC       User Key  (r) = Recorded By, (t) = Taken By, (c) = Cosigned By    Initials Name Provider Type    Eileen Nicole OT Occupational Therapist                OT Assessment/Plan     Row Name  09/27/18 1619          OT Assessment    Functional Limitations Decreased safety during functional activities;Performance in self-care ADL;Limitation in home management  -BC     Impairments Impaired lymphatic circulation;Impaired muscle endurance;Muscle strength;Edema;Impaired flexibility;Balance  -BC     Assessment Comments Pt. evaluated for lymphedema, pain and swelling BLE.  -BC     Please refer to paper survey for additional self-reported information Yes  -BC     OT Diagnosis Lymphedema  -BC     OT Rehab Potential Excellent  -BC     Patient/caregiver participated in establishment of treatment plan and goals Yes  -BC     Patient would benefit from skilled therapy intervention Yes  -BC        OT Plan    OT Frequency 2x/week  -BC     Predicted Duration of Therapy Intervention (Therapy Eval) 12 weeks  -BC     Planned CPT's? OT EVAL HIGH COMPLEXITY: 52492;OT THER ACT EA 15 MIN: 97822JB;OT SELF CARE/MGMT/TRAIN 15 MIN: 91769;OT MANUAL THERAPY EA 15 MIN: 29425;OT VASOPNEUMATIC DEVICE: 70860  -BC     Planned Therapy Interventions (Optional Details) home exercise program;manual therapy techniques;patient/family education;strengthening;stretching;ROM (Range of Motion)   Lymphedema management  -BC     OT Plan Comments Pt. would benefit from skilled intervention to address deficits associated with lymphedema of BLE.  -BC       User Key  (r) = Recorded By, (t) = Taken By, (c) = Cosigned By    Initials Name Provider Type    Eileen Nicole OT Occupational Therapist          Outcome Measure Options: AM-PAC 6 Clicks Daily Activity (OT)  How much help from another is currently needed...  Putting on and taking off regular lower body clothing?: a little  Bathing (including washing, rinsing, and drying): a little  Toileting (which includes using toilet bed pan or urinal): none  Putting on and taking off regular upper body clothing: none  Taking care of personal grooming (such as brushing teeth): none  Eating meals: none  Score:  22         Time Calculation:   OT Start Time: 1400  OT Stop Time: 1500  OT Time Calculation (min): 60 min  OT Non-Billable Time (min): 30 min     Therapy Suggested Charges     Code   Minutes Charges    None             Therapy Charges for Today     Code Description Service Date Service Provider Modifiers Qty    68543120117 HC OT EVAL HIGH COMPLEXITY 3 9/27/2018 Eileen Oh OT GO 1                    Eileen Oh, OT  9/27/2018

## 2018-10-24 ENCOUNTER — HOSPITAL ENCOUNTER (OUTPATIENT)
Dept: OCCUPATIONAL THERAPY | Facility: HOSPITAL | Age: 73
Setting detail: THERAPIES SERIES
Discharge: HOME | End: 2018-10-24

## 2018-10-24 DIAGNOSIS — I89.0 LYMPHEDEMA OF BOTH LOWER EXTREMITIES: Primary | ICD-10-CM

## 2018-10-24 PROCEDURE — 97140 MANUAL THERAPY 1/> REGIONS: CPT

## 2018-10-24 PROCEDURE — 97139 UNLISTED THERAPEUTIC PX: CPT

## 2018-10-24 NOTE — THERAPY TREATMENT NOTE
Outpatient Occupational Therapy Lymphedema Treatment Note   [place]     Patient Name: Layne Case  : 1945  MRN: [id number]  Today's Date: 10/24/2018      Visit Date: 10/24/2018    Patient Active Problem List   Diagnosis   • Abnormal mammogram   • Umbilical hernia   • Umbilical hernia without obstruction or gangrene        Past Medical History:   Diagnosis Date   • Abnormal mammogram    • Anxiety    • Arthritis    • Bray esophagus    • Hypertension         Past Surgical History:   Procedure Laterality Date   • ENDOSCOPY AND COLONOSCOPY  2017   • HYSTERECTOMY     • VENTRAL/INCISIONAL HERNIA REPAIR N/A 2017    Procedure: VENTRAL/INCISIONAL HERNIA REPAIR LAPAROSCOPIC WITH PATCH;  Surgeon: Jf Gorman MD;  Location: [facility];  Service:          Visit Dx:      ICD-10-CM ICD-9-CM   1. Lymphedema of both lower extremities I89.0 457.1             Lymphedema     Row Name 10/24/18 1000             Subjective Pain    Able to rate subjective pain? yes  -BC      Pre-Treatment Pain Level 5  -BC      Subjective Pain Comment My back is hurting bad.   -BC         Subjective Comments    Subjective Comments I've had a little bit of everything done to my back.   -BC         Lymphedema Assessment    Lymphedema Classification RLE:;LLE:  -BC         Lymphedema Edema Assessment    Ptting Edema Category By grade out of 4  -BC      Pitting Edema + 4/4  -BC         Skin Changes/Observations    Location/Assessment Lower Extremity  -BC      Lower Extremity Conditions bilateral:;intact;clean;dry;hairless  -BC      Lower Extremity Color/Pigment bilateral:;blanchable  -BC         Lymphedema Sensation    Lymphedema Sensation Reports LLE:;numbness;tingling  -BC      Lymphedema Sensation Tests light touch;deep touch  -BC      Lymphedema Light Touch RLE:;LLE:;WNL  -BC      Lymphedema Deep Touch LUE:;RLE:;WNL  -BC         Lymphedema Measurements    Measurement Type(s) Quick Girth  -BC      Quick Girth Areas Lower extremities   -BC         LLE Quick Girth (cm)    Met-heads 24.9 cm  -BC      Mid foot 25.5 cm  -BC      Smallest ankle 23.4 cm  -BC      Largest calf 46 cm  -BC      Tib tuberosity 43.5 cm  -BC      Mid patella 52.5 cm  -BC      Distal thigh 60.3 cm  -BC      Proximal thigh 66.8 cm  -BC      Other 1 32 cm  -BC      Other 2 44.8 cm  -BC         RLE Quick Girth (cm)    Met-heads 24.4 cm  -BC      Mid foot 24.7 cm  -BC      Smallest ankle 25 cm  -BC      Largest calf 45.3 cm  -BC      Tib tuberosity 42 cm  -BC      Mid patella 49.8 cm  -BC      Distal thigh 58.3 cm  -BC      Proximal thigh 68.2 cm  -BC      Other 1 31.7 cm  -BC      Other 2 43.5 cm  -BC      RLE Quick Girth Total 412.9  -BC         Manual Lymphatic Drainage    Manual Lymphatic Drainage initial sequence;extremity treatment  -BC      Initial Sequence abdomen  -BC      Abdomen superficial  -BC         Compression/Skin Care    Compression/Skin Care bandaging  -BC      Bandage Layers soft foam- 1/4 inch;cotton elastic stocking- single layer (comment size);short-stretch bandages (comment size/quantity)  -BC      Bandaging Technique circumferential/spiral;light compression  -BC        User Key  (r) = Recorded By, (t) = Taken By, (c) = Cosigned By    Initials Name Provider Type    Eileen Nicole OT Occupational Therapist                        OT Assessment/Plan     Row Name 10/24/18 1314          OT Assessment    Assessment Comments Pt. positioned supine on mat table for manual lymph drainage, compression pump and multi layered bandaging.   -BC       User Key  (r) = Recorded By, (t) = Taken By, (c) = Cosigned By    Initials Name Provider Type    Eileen Nicole OT Occupational Therapist                                           Time Calculation:   OT Start Time: 1020  OT Stop Time: 1150  OT Time Calculation (min): 90 min  OT Non-Billable Time (min): 20 min     Therapy Suggested Charges     Code   Minutes Charges    None               Therapy Charges for Today      Code Description Service Date Service Provider Modifiers Qty    01385576180 HC OT LYMPHEDEMA MANAGEMENT-15 MIN 10/24/2018 Eileen Oh OT  1    50715597146 HC OT MANUAL THERAPY EA 15 MIN 10/24/2018 Eileen Oh OT GO 5                      Eileen Oh, OT  10/24/2018

## 2018-10-25 ENCOUNTER — HOSPITAL ENCOUNTER (OUTPATIENT)
Dept: OCCUPATIONAL THERAPY | Facility: HOSPITAL | Age: 73
Setting detail: THERAPIES SERIES
Discharge: HOME | End: 2018-10-25

## 2018-10-25 DIAGNOSIS — I89.0 LYMPHEDEMA OF BOTH LOWER EXTREMITIES: Primary | ICD-10-CM

## 2018-10-25 PROCEDURE — 97139 UNLISTED THERAPEUTIC PX: CPT

## 2018-10-25 PROCEDURE — 97140 MANUAL THERAPY 1/> REGIONS: CPT

## 2018-10-25 NOTE — THERAPY TREATMENT NOTE
Outpatient Occupational Therapy Lymphedema Treatment Note   [place]     Patient Name: Layne Case  : 1945  MRN: [id number]  Today's Date: 10/25/2018      Visit Date: 10/25/2018    Patient Active Problem List   Diagnosis   • Abnormal mammogram   • Umbilical hernia   • Umbilical hernia without obstruction or gangrene        Past Medical History:   Diagnosis Date   • Abnormal mammogram    • Anxiety    • Arthritis    • Bray esophagus    • Hypertension         Past Surgical History:   Procedure Laterality Date   • ENDOSCOPY AND COLONOSCOPY  2017   • HYSTERECTOMY     • VENTRAL/INCISIONAL HERNIA REPAIR N/A 2017    Procedure: VENTRAL/INCISIONAL HERNIA REPAIR LAPAROSCOPIC WITH PATCH;  Surgeon: Jf Gorman MD;  Location: [facility];  Service:          Visit Dx:      ICD-10-CM ICD-9-CM   1. Lymphedema of both lower extremities I89.0 457.1             Lymphedema     Row Name 10/25/18 1000             Subjective Pain    Able to rate subjective pain? yes  -BC      Pre-Treatment Pain Level 3  -BC      Subjective Pain Comment My back always hurts.  -BC         Subjective Comments    Subjective Comments I've been to busy to even know ilf it hurts.  -BC         Lymphedema Assessment    Lymphedema Classification RLE:;LLE:  -BC         Lymphedema Edema Assessment    Ptting Edema Category By grade out of 4  -BC      Pitting Edema + 4/4  -BC         Skin Changes/Observations    Location/Assessment Lower Extremity  -BC      Lower Extremity Conditions bilateral:;intact;clean;dry;hairless  -BC      Lower Extremity Color/Pigment bilateral:;blanchable  -BC         Lymphedema Sensation    Lymphedema Sensation Reports LLE:;numbness;tingling  -BC      Lymphedema Sensation Tests light touch;deep touch  -BC      Lymphedema Light Touch RLE:;LLE:;WNL  -BC      Lymphedema Deep Touch LUE:;RLE:;WNL  -BC         Lymphedema Measurements    Measurement Type(s) Quick Girth  -BC      Quick Girth Areas Lower extremities  -BC          LLE Quick Girth (cm)    Met-heads 25.4 cm  -BC      Mid foot 24.6 cm  -BC      Smallest ankle 22.6 cm  -BC      Largest calf 45.7 cm  -BC      Tib tuberosity 43.3 cm  -BC      Mid patella 51.3 cm  -BC      Distal thigh 61 cm  -BC      Proximal thigh 67 cm  -BC      Other 1 32.4 cm  -BC      Other 2 44.5 cm  -BC         RLE Quick Girth (cm)    Met-heads 24.6 cm  -BC      Mid foot 24.8 cm  -BC      Smallest ankle 23.1 cm  -BC      Largest calf 44.2 cm  -BC      Tib tuberosity 42.7 cm  -BC      Mid patella 50.5 cm  -BC      Distal thigh 60.3 cm  -BC      Proximal thigh 67.8 cm  -BC      Other 1 31.5 cm  -BC      Other 2 42 cm  -BC      RLE Quick Girth Total 411.5  -BC         Manual Lymphatic Drainage    Manual Lymphatic Drainage initial sequence;extremity treatment  -BC      Initial Sequence abdomen  -BC      Abdomen superficial  -BC         Compression/Skin Care    Compression/Skin Care bandaging  -BC      Bandage Layers soft foam- 1/4 inch;cotton elastic stocking- single layer (comment size);short-stretch bandages (comment size/quantity)  -BC      Bandaging Technique circumferential/spiral;light compression  -BC      Compression/Skin Care Comments Compression pump x 30 min. BLE  -BC        User Key  (r) = Recorded By, (t) = Taken By, (c) = Cosigned By    Initials Name Provider Type    Eileen Nicole, OT Occupational Therapist                        OT Assessment/Plan     Row Name 10/25/18 6079 10/24/18 3379       OT Assessment    Assessment Comments Pt. positioned supine on mat table for manual lymph drainage, compression pump, skin care and multi layered bandaging.  BLE from mid foot to below knee.   -BC Pt. positioned supine on mat table for manual lymph drainage, compression pump and multi layered bandaging.   -BC      User Key  (r) = Recorded By, (t) = Taken By, (c) = Cosigned By    Initials Name Provider Type    Eileen Nicole, OT Occupational Therapist                                           Time  Calculation:   OT Start Time: 1030  OT Stop Time: 1158  OT Time Calculation (min): 88 min  OT Non-Billable Time (min): 20 min     Therapy Suggested Charges     Code   Minutes Charges    None               Therapy Charges for Today     Code Description Service Date Service Provider Modifiers Qty    03339406809 HC OT LYMPHEDEMA MANAGEMENT-15 MIN 10/25/2018 Eileen Oh OT  1    18102735169 HC OT MANUAL THERAPY EA 15 MIN 10/25/2018 Eileen Oh OT GO 5                      Eileen Oh, OT  10/25/2018

## 2018-10-31 ENCOUNTER — HOSPITAL ENCOUNTER (OUTPATIENT)
Dept: OCCUPATIONAL THERAPY | Facility: HOSPITAL | Age: 73
Setting detail: THERAPIES SERIES
Discharge: HOME | End: 2018-10-31

## 2018-10-31 DIAGNOSIS — I89.0 LYMPHEDEMA OF BOTH LOWER EXTREMITIES: Primary | ICD-10-CM

## 2018-10-31 PROCEDURE — 97140 MANUAL THERAPY 1/> REGIONS: CPT

## 2018-10-31 PROCEDURE — 97139 UNLISTED THERAPEUTIC PX: CPT

## 2018-11-01 ENCOUNTER — HOSPITAL ENCOUNTER (OUTPATIENT)
Dept: OCCUPATIONAL THERAPY | Facility: HOSPITAL | Age: 73
Setting detail: THERAPIES SERIES
Discharge: HOME | End: 2018-11-01

## 2018-11-01 DIAGNOSIS — I89.0 LYMPHEDEMA OF BOTH LOWER EXTREMITIES: Primary | ICD-10-CM

## 2018-11-01 PROCEDURE — 97140 MANUAL THERAPY 1/> REGIONS: CPT

## 2018-11-01 PROCEDURE — 97139 UNLISTED THERAPEUTIC PX: CPT

## 2018-11-01 NOTE — THERAPY TREATMENT NOTE
Outpatient Occupational Therapy Lymphedema Progress Note   [place]     Patient Name: Layne Case  : 1945  MRN: [id number]  Today's Date: 2018      Visit Date: 2018    Patient Active Problem List   Diagnosis   • Abnormal mammogram   • Umbilical hernia   • Umbilical hernia without obstruction or gangrene        Past Medical History:   Diagnosis Date   • Abnormal mammogram    • Anxiety    • Arthritis    • Bray esophagus    • Hypertension         Past Surgical History:   Procedure Laterality Date   • ENDOSCOPY AND COLONOSCOPY  2017   • HYSTERECTOMY     • VENTRAL/INCISIONAL HERNIA REPAIR N/A 2017    Procedure: VENTRAL/INCISIONAL HERNIA REPAIR LAPAROSCOPIC WITH PATCH;  Surgeon: Jf Gorman MD;  Location: [facility];  Service:          Visit Dx:      ICD-10-CM ICD-9-CM   1. Lymphedema of both lower extremities I89.0 457.1             Lymphedema     Row Name 18 0900             Subjective Pain    Able to rate subjective pain? yes  -BC      Pre-Treatment Pain Level 5  -BC      Subjective Pain Comment I guess a five, it come s and goes depends on my position.   -BC         Lymphedema Assessment    Lymphedema Classification RLE:;LLE:  -BC         Lymphedema Edema Assessment    Ptting Edema Category By grade out of 4  -BC      Pitting Edema + 4/4  -BC         Skin Changes/Observations    Location/Assessment Lower Extremity  -BC      Lower Extremity Conditions bilateral:;intact;clean;dry;hairless  -BC      Lower Extremity Color/Pigment bilateral:;blanchable  -BC         Lymphedema Sensation    Lymphedema Sensation Reports LLE:;numbness;tingling  -BC      Lymphedema Sensation Tests light touch;deep touch  -BC      Lymphedema Light Touch RLE:;LLE:;WNL  -BC      Lymphedema Deep Touch LUE:;RLE:;WNL  -BC         Lymphedema Measurements    Measurement Type(s) Quick Girth  -BC      Quick Girth Areas Lower extremities  -BC         LLE Quick Girth (cm)    Met-heads 24.6 cm  -BC      Mid foot  24.2 cm  -BC      Smallest ankle 21.6 cm  -BC      Largest calf 44.5 cm  -BC      Tib tuberosity 42.7 cm  -BC      Mid patella 49.3 cm  -BC      Distal thigh 58 cm  -BC      Proximal thigh 66.4 cm  -BC      Other 1 32.9 cm  -BC      Other 2 44 cm  -BC         RLE Quick Girth (cm)    Met-heads 23.7 cm  -BC      Mid foot 23.4 cm  -BC      Smallest ankle 22.7 cm  -BC      Largest calf 42.3 cm  -BC      Tib tuberosity 41.1 cm  -BC      Mid patella 48.1 cm  -BC      Distal thigh 61 cm  -BC      Proximal thigh 69.4 cm  -BC      Other 1 28.3 cm  -BC      Other 2 41 cm  -BC      RLE Quick Girth Total 401  -BC         Manual Lymphatic Drainage    Manual Lymphatic Drainage initial sequence;extremity treatment  -BC      Initial Sequence abdomen  -BC      Abdomen superficial  -BC         Compression/Skin Care    Compression/Skin Care bandaging  -BC      Bandage Layers soft foam- 1/4 inch;cotton elastic stocking- single layer (comment size);short-stretch bandages (comment size/quantity)  -BC      Bandaging Technique circumferential/spiral;light compression  -BC      Compression/Skin Care Comments Compression pump x 30 min.  -BC        User Key  (r) = Recorded By, (t) = Taken By, (c) = Cosigned By    Initials Name Provider Type    Eileen Nicole, OT Occupational Therapist                        OT Assessment/Plan     Row Name 11/01/18 1245          OT Assessment    Assessment Comments Pt. positioned in reclined long sitting for manual lymph drainage, compression pump, skin care and multi layered bandaging.  -BC       User Key  (r) = Recorded By, (t) = Taken By, (c) = Cosigned By    Initials Name Provider Type    Eileen Nicole, OT Occupational Therapist                            OT Goals     Row Name 11/01/18 1200          OT Short Term Goals    STG 1 Progress Not Met  -BC     STG 2 Progress Progressing  -BC     STG 3 Progress Not Met  -BC     STG 4 Progress Ongoing  -BC        Long Term Goals    LTG 1 Progress  Progressing  -BC     LTG 2 Progress Ongoing  -BC     LTG 3 Progress Not Met  -BC     LTG 4 Progress Not Met  -BC       User Key  (r) = Recorded By, (t) = Taken By, (c) = Cosigned By    Initials Name Provider Type    Eileen Nicole OT Occupational Therapist                           Time Calculation:   OT Start Time: 0900  OT Stop Time: 1030  OT Time Calculation (min): 90 min  OT Non-Billable Time (min): 20 min     Therapy Suggested Charges     Code   Minutes Charges    None               Therapy Charges for Today     Code Description Service Date Service Provider Modifiers Qty    41831086796 HC OT LYMPHEDEMA MANAGEMENT-15 MIN 11/1/2018 Eileen Oh OT  1    39598514369 HC OT MANUAL THERAPY EA 15 MIN 11/1/2018 Eileen Oh OT GO 5                      Eileen Oh OT  11/1/2018

## 2018-11-08 ENCOUNTER — TRANSCRIBE ORDERS (OUTPATIENT)
Dept: PHYSICAL THERAPY | Facility: HOSPITAL | Age: 73
End: 2018-11-08

## 2018-11-08 ENCOUNTER — HOSPITAL ENCOUNTER (OUTPATIENT)
Dept: OCCUPATIONAL THERAPY | Facility: HOSPITAL | Age: 73
Setting detail: THERAPIES SERIES
Discharge: HOME | End: 2018-11-08

## 2018-11-08 DIAGNOSIS — R26.89 BALANCE PROBLEMS: Primary | ICD-10-CM

## 2018-11-08 DIAGNOSIS — I89.0 LYMPHEDEMA OF BOTH LOWER EXTREMITIES: Primary | ICD-10-CM

## 2018-11-08 PROCEDURE — 97140 MANUAL THERAPY 1/> REGIONS: CPT

## 2018-11-08 PROCEDURE — 97139 UNLISTED THERAPEUTIC PX: CPT

## 2018-11-08 NOTE — THERAPY TREATMENT NOTE
Outpatient Occupational Therapy Lymphedema Treatment Note  [facility]     Patient Name: Layne Case  : 1945  MRN: [id number]  Today's Date: 2018      Visit Date: 2018    Patient Active Problem List   Diagnosis   • Abnormal mammogram   • Umbilical hernia   • Umbilical hernia without obstruction or gangrene        Past Medical History:   Diagnosis Date   • Abnormal mammogram    • Anxiety    • Arthritis    • Bray esophagus    • Hypertension         Past Surgical History:   Procedure Laterality Date   • ENDOSCOPY AND COLONOSCOPY  2017   • HYSTERECTOMY     • VENTRAL/INCISIONAL HERNIA REPAIR N/A 2017    Procedure: VENTRAL/INCISIONAL HERNIA REPAIR LAPAROSCOPIC WITH PATCH;  Surgeon: Jf Gorman MD;  Location: [facility];  Service:          Visit Dx:      ICD-10-CM ICD-9-CM   1. Lymphedema of both lower extremities I89.0 457.1             Lymphedema     Row Name 18 0900             Subjective Pain    Able to rate subjective pain? yes  -BC      Pre-Treatment Pain Level 3  -BC      Subjective Pain Comment I have taken my medicine today and when i am laying down its not to bad., It gets worse when I get up and walk.  -BC         Lymphedema Assessment    Lymphedema Classification RLE:;LLE:  -BC         Lymphedema Edema Assessment    Ptting Edema Category By grade out of 4  -BC      Pitting Edema + 4/4  -BC         Skin Changes/Observations    Location/Assessment Lower Extremity  -BC      Lower Extremity Conditions bilateral:;intact;clean;dry;hairless  -BC      Lower Extremity Color/Pigment bilateral:;blanchable  -BC         Lymphedema Sensation    Lymphedema Sensation Reports LLE:;numbness;tingling  -BC      Lymphedema Sensation Tests light touch;deep touch  -BC      Lymphedema Light Touch RLE:;LLE:;WNL  -BC      Lymphedema Deep Touch LUE:;RLE:;WNL  -BC         Lymphedema Measurements    Measurement Type(s) Quick Girth  -BC      Quick Girth Areas Lower extremities  -BC         LLE Quick  Girth (cm)    Met-heads 25.3 cm  -BC      Mid foot 25.3 cm  -BC      Smallest ankle 23.6 cm  -BC      Largest calf 47.1 cm  -BC      Tib tuberosity 45 cm  -BC      Mid patella 52.6 cm  -BC      Distal thigh 60.2 cm  -BC      Proximal thigh 67 cm  -BC      Other 1 33.7 cm  -BC      Other 2 45.8 cm  -BC         RLE Quick Girth (cm)    Met-heads 24.3 cm  -BC      Mid foot 24.2 cm  -BC      Smallest ankle 24.5 cm  -BC      Largest calf 44.7 cm  -BC      Tib tuberosity 43 cm  -BC      Mid patella 50.2 cm  -BC      Distal thigh 62.5 cm  -BC      Proximal thigh 70.1 cm  -BC      Other 1 31.2 cm  -BC      Other 2 42.8 cm  -BC      RLE Quick Girth Total 417.5  -BC         Manual Lymphatic Drainage    Manual Lymphatic Drainage initial sequence;extremity treatment  -BC      Initial Sequence abdomen  -BC      Abdomen superficial  -BC      Manual Lymphatic Drainage Comments Pt. tolerated tx. well with c/o keeping garments up and on.  Pt. unable to wrap I'ly.   -BC         Compression/Skin Care    Compression/Skin Care bandaging  -BC      Bandage Layers soft foam- 1/4 inch;cotton elastic stocking- single layer (comment size);short-stretch bandages (comment size/quantity)  -BC      Bandaging Technique circumferential/spiral;light compression  -BC      Compression/Skin Care Comments Compression pump x 30 min.  -BC        User Key  (r) = Recorded By, (t) = Taken By, (c) = Cosigned By    Initials Name Provider Type    Eileen Nicole OT Occupational Therapist                        OT Assessment/Plan     Row Name 11/08/18 1123          OT Assessment    Assessment Comments Pt. positioned supine on mat table for manual lymph drainage, compression pump, skin care and multi layered bandaging.   -BC       User Key  (r) = Recorded By, (t) = Taken By, (c) = Cosigned By    Initials Name Provider Type    Eileen Nicole OT Occupational Therapist                                           Time Calculation:   OT Start Time: 0900  OT Stop  Time: 1030  OT Time Calculation (min): 90 min  OT Non-Billable Time (min): 20 min     Therapy Suggested Charges     Code   Minutes Charges    None               Therapy Charges for Today     Code Description Service Date Service Provider Modifiers Qty    46464377230 HC OT LYMPHEDEMA MANAGEMENT-15 MIN 11/8/2018 Eileen Oh OT  1    29734715923 HC OT MANUAL THERAPY EA 15 MIN 11/8/2018 Eileen Oh OT GO 5                      Eileen Oh OT  11/8/2018

## 2018-11-09 ENCOUNTER — HOSPITAL ENCOUNTER (OUTPATIENT)
Dept: OCCUPATIONAL THERAPY | Facility: HOSPITAL | Age: 73
Setting detail: THERAPIES SERIES
Discharge: HOME | End: 2018-11-09

## 2018-11-09 DIAGNOSIS — I89.0 LYMPHEDEMA OF BOTH LOWER EXTREMITIES: Primary | ICD-10-CM

## 2018-11-09 PROCEDURE — 97139 UNLISTED THERAPEUTIC PX: CPT

## 2018-11-09 PROCEDURE — 97140 MANUAL THERAPY 1/> REGIONS: CPT

## 2018-11-09 NOTE — THERAPY TREATMENT NOTE
Outpatient Occupational Therapy Lymphedema Treatment Note   [place]     Patient Name: Layne Case  : 1945  MRN: [id number]  Today's Date: 2018      Visit Date: 2018    Patient Active Problem List   Diagnosis   • Abnormal mammogram   • Umbilical hernia   • Umbilical hernia without obstruction or gangrene        Past Medical History:   Diagnosis Date   • Abnormal mammogram    • Anxiety    • Arthritis    • Bray esophagus    • Hypertension         Past Surgical History:   Procedure Laterality Date   • ENDOSCOPY AND COLONOSCOPY  2017   • HYSTERECTOMY     • VENTRAL/INCISIONAL HERNIA REPAIR N/A 2017    Procedure: VENTRAL/INCISIONAL HERNIA REPAIR LAPAROSCOPIC WITH PATCH;  Surgeon: Jf Gorman MD;  Location: [facility];  Service:          Visit Dx:      ICD-10-CM ICD-9-CM   1. Lymphedema of both lower extremities I89.0 457.1             Lymphedema     Row Name 18 1500             Subjective Pain    Able to rate subjective pain? yes  -BC      Pre-Treatment Pain Level 5  -BC         Lymphedema Assessment    Lymphedema Classification RLE:;LLE:  -BC         Lymphedema Edema Assessment    Ptting Edema Category By grade out of 4  -BC      Pitting Edema + 4/4  -BC         Skin Changes/Observations    Location/Assessment Lower Extremity  -BC      Lower Extremity Conditions bilateral:;intact;clean;dry;hairless  -BC      Lower Extremity Color/Pigment bilateral:;blanchable  -BC         Lymphedema Sensation    Lymphedema Sensation Reports LLE:;numbness;tingling  -BC      Lymphedema Sensation Tests light touch;deep touch  -BC      Lymphedema Light Touch RLE:;LLE:;WNL  -BC      Lymphedema Deep Touch LUE:;RLE:;WNL  -BC         Lymphedema Measurements    Measurement Type(s) Quick Girth  -BC      Quick Girth Areas Lower extremities  -BC         LLE Quick Girth (cm)    Met-heads 25.2 cm  -BC      Mid foot 25 cm  -BC      Smallest ankle 22.9 cm  -BC      Largest calf 46.7 cm  -BC      Tib tuberosity  44.8 cm  -BC      Mid patella 53.3 cm  -BC      Distal thigh 60.7 cm  -BC      Proximal thigh 66.9 cm  -BC      Other 1 32.2 cm  -BC      Other 2 45.7 cm  -BC         RLE Quick Girth (cm)    Met-heads 24.3 cm  -BC      Mid foot 24.2 cm  -BC      Smallest ankle 23 cm  -BC      Largest calf 43 cm  -BC      Tib tuberosity 41.5 cm  -BC      Mid patella 48.6 cm  -BC      Distal thigh 62 cm  -BC      Proximal thigh 69.2 cm  -BC      Other 1 29.5 cm  -BC      Other 2 41.2 cm  -BC      RLE Quick Girth Total 406.5  -BC         Manual Lymphatic Drainage    Manual Lymphatic Drainage initial sequence;extremity treatment  -BC      Initial Sequence abdomen  -BC      Abdomen superficial  -BC         Compression/Skin Care    Compression/Skin Care bandaging  -BC      Bandage Layers soft foam- 1/4 inch;cotton elastic stocking- single layer (comment size);short-stretch bandages (comment size/quantity)  -BC      Bandaging Technique circumferential/spiral;light compression  -BC      Compression/Skin Care Comments Compression pump x 30 min.   -BC        User Key  (r) = Recorded By, (t) = Taken By, (c) = Cosigned By    Initials Name Provider Type    Eileen Nicole, OT Occupational Therapist                        OT Assessment/Plan     Row Name 11/09/18 1610          OT Assessment    Assessment Comments Pt. positioned supine on mat table for manual lymph drainage, compression pump, skin care and multi layered bandaging.   -BC       User Key  (r) = Recorded By, (t) = Taken By, (c) = Cosigned By    Initials Name Provider Type    BC Eileen Oh OT Occupational Therapist                                           Time Calculation:   OT Start Time: 1430  OT Stop Time: 1600  OT Time Calculation (min): 90 min  OT Non-Billable Time (min): 20 min     Therapy Suggested Charges     Code   Minutes Charges    None               Therapy Charges for Today     Code Description Service Date Service Provider Modifiers Qty    93446604262  OT  LYMPHEDEMA MANAGEMENT-15 MIN 11/9/2018 Eileen Oh OT  1    [id number] HC OT MANUAL THERAPY EA 15 MIN 11/9/2018 Eileen Oh OT GO 5                      Eileen Oh, OT  11/9/2018

## 2018-11-12 ENCOUNTER — HOSPITAL ENCOUNTER (OUTPATIENT)
Dept: PHYSICAL THERAPY | Facility: HOSPITAL | Age: 73
Setting detail: THERAPIES SERIES
Discharge: HOME | End: 2018-11-12

## 2018-11-12 ENCOUNTER — HOSPITAL ENCOUNTER (OUTPATIENT)
Dept: OCCUPATIONAL THERAPY | Facility: HOSPITAL | Age: 73
Setting detail: THERAPIES SERIES
Discharge: HOME | End: 2018-11-12

## 2018-11-12 DIAGNOSIS — I89.0 LYMPHEDEMA OF BOTH LOWER EXTREMITIES: Primary | ICD-10-CM

## 2018-11-12 DIAGNOSIS — R26.89 BALANCE PROBLEMS: Primary | ICD-10-CM

## 2018-11-12 PROCEDURE — 97139 UNLISTED THERAPEUTIC PX: CPT

## 2018-11-12 PROCEDURE — 97163 PT EVAL HIGH COMPLEX 45 MIN: CPT | Performed by: PHYSICAL THERAPIST

## 2018-11-12 PROCEDURE — G8978 MOBILITY CURRENT STATUS: HCPCS | Performed by: PHYSICAL THERAPIST

## 2018-11-12 PROCEDURE — G8979 MOBILITY GOAL STATUS: HCPCS | Performed by: PHYSICAL THERAPIST

## 2018-11-12 PROCEDURE — 97140 MANUAL THERAPY 1/> REGIONS: CPT

## 2018-11-12 NOTE — THERAPY EVALUATION
"    .Outpatient Physical Therapy Neuro Initial Evaluation   [facility]     Patient Name: Layne Case  : 1945  MRN: [id number]  Today's Date: 2018      Visit Date: 2018    Patient Active Problem List   Diagnosis   • Abnormal mammogram   • Umbilical hernia   • Umbilical hernia without obstruction or gangrene        Past Medical History:   Diagnosis Date   • Abnormal mammogram    • Anxiety    • Arthritis    • Bray esophagus    • Hypertension         Past Surgical History:   Procedure Laterality Date   • ENDOSCOPY AND COLONOSCOPY  2017   • HYSTERECTOMY           Visit Dx:     ICD-10-CM ICD-9-CM   1. Balance problems R26.89 781.99       Patient History     Row Name 18 1300             History    Chief Complaint  Dizziness;Balance Problems;Difficulty with daily activities  -BE      Date Current Problem(s) Began  -- 2 months ago  -BE      Brief Description of Current Complaint  Pateint reports that she has noticed decreased balance approximately 2 months ago.  She notes that she notices decreased balance when going from sitting<>standing or when walking.  She reports that her LEs feel weak, especially her left LE.  Patient reports that it is difficult to lift her legs, which makes it difficult to climb steps; she notes that her legs feel \"heavy\", but does report that she has lymphedema.  She notes that has noticed some dizziness when she goes to lay down/sit up; she describes it as the \"room is spinning.\"  -BE      Patient/Caregiver Goals  Improve mobility;Improve strength  -BE      Current Tobacco Use  Non-smoker  -BE      Smoking Status  Non-smoker  -BE      Patient's Rating of General Health  Fair  -BE      Hand Dominance  right-handed  -BE      Occupation/sports/leisure activities  Retired  -BE      Patient seeing anyone else for problem(s)?  PCP  -BE      Are you or can you be pregnant  No  -BE         Pain     Pain Location  Back  -BE      Pain at Present  2  -BE      Pain at Best  2  " -BE      Pain at Worst  6  -BE      Pain Frequency  Constant/continuous  -BE      Pain Description  Dull;Aching;Sharp;Stabbing  -BE         Fall Risk Assessment    Any falls in the past year:  No  -BE      Number of falls reported in the last 12 months  0  -BE         Services    Are you currently receiving Home Health services  No  -BE         Daily Activities    Primary Language  English  -BE      Are you able to read  Yes  -BE      Are you able to write  Yes  -BE      How does patient learn best?  Listening  -BE      Teaching needs identified  Home Exercise Program;Falls Prevention;Home Safety  -BE      Does patient have problems with the following?  Panic Attack MD aware, per patient  -BE      Pt Participated in POC and Goals  Yes  -BE         Safety    Are you being hurt, hit, or frightened by anyone at home or in your life?  No  -BE      Are you being neglected by a caregiver  No  -BE        User Key  (r) = Recorded By, (t) = Taken By, (c) = Cosigned By    Initials Name Provider Type    BE Lexii Whtitington, PT Physical Therapist              PT Neuro     Row Name 11/12/18 1300             Home Living    Living Arrangements  house 1 story house with basement  -BE      Home Accessibility  stairs within home  -BE      Number of Stairs, Within Home, Primary  other (see comments) 13  -BE      Stair Railings, Within Home, Primary  railing on left side (ascending)  -BE      Home Equipment  Walker;Quad cane  -BE      Living Environment Comment  Lives with  and adult son  -BE         Sensation    Sensation WNL?  WNL bilateral LEs  -BE         MMT Right Lower Ext    Rt Hip Flexion MMT, Gross Movement  (4/5) good  -BE      Rt Knee Extension MMT, Gross Movement  (4/5) good  -BE      Rt Knee Flexion MMT, Gross Movement  (4/5) good  -BE      Rt Ankle Plantarflexion MMT, Gross Movement  (4/5) good  -BE      Rt Ankle Dorsiflexion MMT, Gross Movement  (4/5) good  -BE         MMT Left Lower Ext    Lt Hip Flexion MMT,  Gross Movement  (4/5) good  -BE      Lt Knee Extension MMT, Gross Movement  (4/5) good  -BE      Lt Knee Flexion MMT, Gross Movement  (4/5) good  -BE      Lt Ankle Plantarflexion MMT, Gross Movement  (4/5) good  -BE      Lt Ankle Dorsiflexion MMT, Gross Movement  (4/5) good  -BE        User Key  (r) = Recorded By, (t) = Taken By, (c) = Cosigned By    Initials Name Provider Type    BE Lexii Whittington PT Physical Therapist          Lymphedema     Row Name 11/12/18 1500             Subjective Pain    Able to rate subjective pain?  yes  -BC      Pre-Treatment Pain Level  5  -BC      Subjective Pain Comment  My back kills me all the time.  I guess I need that operation but I am afraid I will come out worse off.   -BC         Lymphedema Assessment    Lymphedema Classification  RLE:;LLE:  -BC         Lymphedema Edema Assessment    Ptting Edema Category  By grade out of 4  -BC      Pitting Edema  + 4/4  -BC         Skin Changes/Observations    Location/Assessment  Lower Extremity  -BC      Lower Extremity Conditions  bilateral:;intact;clean;dry;hairless  -BC      Lower Extremity Color/Pigment  bilateral:;blanchable  -BC         Lymphedema Sensation    Lymphedema Sensation Reports  LLE:;numbness;tingling  -BC      Lymphedema Sensation Tests  light touch;deep touch  -BC      Lymphedema Light Touch  RLE:;LLE:;WNL  -BC      Lymphedema Deep Touch  LUE:;RLE:;WNL  -BC         Lymphedema Measurements    Measurement Type(s)  Quick Girth  -BC      Quick Girth Areas  Lower extremities  -BC         LLE Quick Girth (cm)    Met-heads  25.3 cm  -BC      Mid foot  26.3 cm  -BC      Smallest ankle  22.7 cm  -BC      Largest calf  46.4 cm  -BC      Tib tuberosity  44.3 cm  -BC      Mid patella  49 cm  -BC      Distal thigh  58.8 cm  -BC      Proximal thigh  66 cm  -BC      Other 1  31.1 cm  -BC      Other 2  44.8 cm  -BC         RLE Quick Girth (cm)    Met-heads  24.6 cm  -BC      Mid foot  25.2 cm  -BC      Smallest ankle  23.6 cm  -BC       Largest calf  44.7 cm  -BC      Tib tuberosity  43.3 cm  -BC      Mid patella  50.2 cm  -BC      Distal thigh  61 cm  -BC      Proximal thigh  68.5 cm  -BC      Other 1  29.8 cm  -BC      Other 2  42.2 cm  -BC      RLE Quick Girth Total  413.1  -BC         Manual Lymphatic Drainage    Manual Lymphatic Drainage  initial sequence;extremity treatment  -BC      Initial Sequence  abdomen  -BC      Abdomen  superficial  -BC         Compression/Skin Care    Compression/Skin Care  bandaging  -BC      Bandage Layers  soft foam- 1/4 inch;cotton elastic stocking- single layer (comment size);short-stretch bandages (comment size/quantity)  -BC      Bandaging Technique  circumferential/spiral;light compression  -BC        User Key  (r) = Recorded By, (t) = Taken By, (c) = Cosigned By    Initials Name Provider Type    Eileen Nicole OT Occupational Therapist              Therapy Education  Given: Symptoms/condition management, Fall prevention and home safety, Mobility training, Posture/body mechanics  Program: New  How Provided: Verbal, Demonstration  Provided to: Patient  Level of Understanding: Verbalized, Demonstrated    PT OP Goals     Row Name 11/12/18 1600          PT Short Term Goals    STG Date to Achieve  11/26/18  -BE     STG 1  Patient will be independent/compliant with HEP.  -BE     STG 2  NINO will improve to at least 42/56 to show improved functional mobility.  -BE     STG 3  Patient will be able to perform feet together, eyes closed for 10 seconds with minimal postural sway.  -BE     STG 4  Patient will be able to perform tandem stance for 5 seconds.  -BE        Long Term Goals    LTG Date to Achieve  12/12/18  -BE     LTG 1  LE strength will improve to at least 4+/5 to prevent injury.  -BE     LTG 2  NINO will imrpove to at least 46/56 to show improved functional mobility and decreased fall risk.  -BE     LTG 3  Patient will be able to perform tandem stance for 10 seconds.  -BE     LTG 4  Patient will be  able to perform SLS for 5 seconds to show improved balance.  -BE        Time Calculation    PT Goal Re-Cert Due Date  12/12/18  -BE       User Key  (r) = Recorded By, (t) = Taken By, (c) = Cosigned By    Initials Name Provider Type    BE Lexii Whittington PT Physical Therapist          PT Assessment/Plan     Row Name 11/12/18 1600          PT Assessment    Functional Limitations  Decreased safety during functional activities;Impaired gait;Impaired locomotion;Limitation in home management;Limitations in community activities;Performance in leisure activities;Performance in self-care ADL  -BE     Impairments  Balance;Endurance;Gait;Muscle strength;Pain;Poor body mechanics;Posture  -BE     Assessment Comments  Patient is a 73 year old female referred to therapy for balance training.  Patient presents with decreased LE strength and decreased balance.  Patient scored 37/56 on the NINO Balance Assessment, which indicates patient is at increased risk for falls.  Patient reports that she has difficulty with ascending stairs and has to use a step to pattern.  Patient reported complaints of dizziness during initial evaluation, noting it occurs when changing positions.  MD contacted regarding concerns of possible vertigo; order faxed for vertigo screening at next session.  Patient will benefit from skilled PT so that she can achieve her maximum level of function.  -BE     Please refer to paper survey for additional self-reported information  Yes  -BE     Rehab Potential  Good  -BE     Patient/caregiver participated in establishment of treatment plan and goals  Yes  -BE     Patient would benefit from skilled therapy intervention  Yes  -BE        PT Plan    PT Frequency  2x/week  -BE     Predicted Duration of Therapy Intervention (Therapy Eval)  4 weeks  -BE     Planned CPT's?  PT EVAL HIGH COMPLEXITY: 38006;PT RE-EVAL: 30643;PT THER PROC EA 15 MIN: 22970;PT THER ACT EA 15 MIN: 05320;PT MANUAL THERAPY EA 15 MIN: 42870;PT  NEUROMUSC RE-EDUCATION EA 15 MIN: 40647;PT GAIT TRAINING EA 15 MIN: 30084;PT HOT OR COLD PACK TREAT MCARE;PT THER SUPP EA 15 MIN  -BE     PT Plan Comments  Above interventions to be used to promote improved functional mobility.  -BE       User Key  (r) = Recorded By, (t) = Taken By, (c) = Cosigned By    Initials Name Provider Type    BE Lexii Whittington, PT Physical Therapist             Exercises     Row Name 11/12/18 1500             Subjective Pain    Able to rate subjective pain?  yes  -BC      Pre-Treatment Pain Level  5  -BC      Subjective Pain Comment  My back kills me all the time.  I guess I need that operation but I am afraid I will come out worse off.   -BC        User Key  (r) = Recorded By, (t) = Taken By, (c) = Cosigned By    Initials Name Provider Type    Eileen Nicole OT Occupational Therapist                      Outcome Measure Options: San Balance  San Balance Scale  Sitting to Standing: able to stand without using hands and stabilize independently  Standing Unsupported: able to stand safely for 2 minutes  Sitting with Back Unsupported but Feet Supported on Floor or on Stool: able to sit safely and securely for 2 minutes  Standing to Sitting: controls descent by using hands  Transfers: able to transfer safely definite need of hands  Standing Unsupported with Eyes Closed: able to stand 10 seconds with supervision  Standing Unsupported with Feet Together: able to place feet together independently and stand 1 minute with supervision  Reaching Forward with Outstretched Arm While Standing: can reach forward 5 cm (2 inches)   Object From the Floor From a Standing Position: able to  object but needs supervision  Turning to Look Behind Over Left and Right Shoulders While Standing: looks behind from both sides and weight shifts well  Turn 360 Degrees: able to turn 360 degrees safely but slowly  Place Alternate Foot on Step or Stool While Standing Unsupported: able to complete 4  steps without aid with supervision  Standing Unsupported with One Foot in Front: loses balance while stepping or standing  Standing on One Leg: unable to try of needs assist to prevent fall  San Total Score: 37       Time Calculation:   Start Time: 1315  Stop Time: 1400  Time Calculation (min): 45 min   Therapy Suggested Charges     Code   Minutes Charges    None           Therapy Charges for Today     Code Description Service Date Service Provider Modifiers Qty    51349298123 HC PT MOBILITY CURRENT 11/12/2018 Lexii Whittington, PT GP, CJ 1    91332097060 HC PT MOBILITY PROJECTED 11/12/2018 Lexii Whittington, PT GP, CI 1    [id number] HC PT EVAL HIGH COMPLEXITY 3 11/12/2018 Lexii Whittington, PT GP 1          PT G-Codes  PT Professional Judgement Used?: Yes  Outcome Measure Options: San Balance  San Total Score: 37  Functional Limitation: Mobility: Walking and moving around  Mobility: Walking and Moving Around Current Status (): At least 20 percent but less than 40 percent impaired, limited or restricted  Mobility: Walking and Moving Around Goal Status (): At least 1 percent but less than 20 percent impaired, limited or restricted         Lexii Whittington, PT  11/12/2018

## 2018-11-13 NOTE — THERAPY TREATMENT NOTE
Outpatient Occupational Therapy Lymphedema Treatment Note   [facility]     Patient Name: Layne Case  : 1945  MRN: [id number]  Today's Date: 2018      Visit Date: 2018    Patient Active Problem List   Diagnosis   • Abnormal mammogram   • Umbilical hernia   • Umbilical hernia without obstruction or gangrene        Past Medical History:   Diagnosis Date   • Abnormal mammogram    • Anxiety    • Arthritis    • Bray esophagus    • Hypertension         Past Surgical History:   Procedure Laterality Date   • ENDOSCOPY AND COLONOSCOPY  2017   • HYSTERECTOMY           Visit Dx:      ICD-10-CM ICD-9-CM   1. Lymphedema of both lower extremities I89.0 457.1       Lymphedema     Row Name 18 1500             Subjective Pain    Able to rate subjective pain?  yes  -BC      Pre-Treatment Pain Level  5  -BC      Subjective Pain Comment  My back kills me all the time.  I guess I need that operation but I am afraid I will come out worse off.   -BC         Lymphedema Assessment    Lymphedema Classification  RLE:;LLE:  -BC         Lymphedema Edema Assessment    Ptting Edema Category  By grade out of 4  -BC      Pitting Edema  + 4/4  -BC         Skin Changes/Observations    Location/Assessment  Lower Extremity  -BC      Lower Extremity Conditions  bilateral:;intact;clean;dry;hairless  -BC      Lower Extremity Color/Pigment  bilateral:;blanchable  -BC         Lymphedema Sensation    Lymphedema Sensation Reports  LLE:;numbness;tingling  -BC      Lymphedema Sensation Tests  light touch;deep touch  -BC      Lymphedema Light Touch  RLE:;LLE:;WNL  -BC      Lymphedema Deep Touch  LUE:;RLE:;WNL  -BC         Lymphedema Measurements    Measurement Type(s)  Quick Girth  -BC      Quick Girth Areas  Lower extremities  -BC         LLE Quick Girth (cm)    Met-heads  25.3 cm  -BC      Mid foot  26.3 cm  -BC      Smallest ankle  22.7 cm  -BC      Largest calf  46.4 cm  -BC      Tib tuberosity  44.3 cm  -BC      Mid patella   49 cm  -BC      Distal thigh  58.8 cm  -BC      Proximal thigh  66 cm  -BC      Other 1  31.1 cm  -BC      Other 2  44.8 cm  -BC         RLE Quick Girth (cm)    Met-heads  24.6 cm  -BC      Mid foot  25.2 cm  -BC      Smallest ankle  23.6 cm  -BC      Largest calf  44.7 cm  -BC      Tib tuberosity  43.3 cm  -BC      Mid patella  50.2 cm  -BC      Distal thigh  61 cm  -BC      Proximal thigh  68.5 cm  -BC      Other 1  29.8 cm  -BC      Other 2  42.2 cm  -BC      RLE Quick Girth Total  413.1  -BC         Manual Lymphatic Drainage    Manual Lymphatic Drainage  initial sequence;extremity treatment  -BC      Initial Sequence  abdomen  -BC      Abdomen  superficial  -BC         Compression/Skin Care    Compression/Skin Care  bandaging  -BC      Bandage Layers  soft foam- 1/4 inch;cotton elastic stocking- single layer (comment size);short-stretch bandages (comment size/quantity)  -BC      Bandaging Technique  circumferential/spiral;light compression  -BC      Compression/Skin Care Comments  Compression pump x 30 min.  -BC        User Key  (r) = Recorded By, (t) = Taken By, (c) = Cosigned By    Initials Name Provider Type    Eileen Nicole, OT Occupational Therapist                  OT Assessment/Plan     Row Name 11/12/18 1951 11/12/18 1600       OT Assessment    Assessment Comments  Pt. positioned supine on mat table for manual lymph drainage, compression pump, skin care and multi layered bandaging.   -BC  --       OT Plan    Predicted Duration of Therapy Intervention (Therapy Eval)  --  4 weeks  -BE      User Key  (r) = Recorded By, (t) = Taken By, (c) = Cosigned By    Initials Name Provider Type    Eileen Nicole OT Occupational Therapist    BE Lexii Whittington, PT Physical Therapist                                           Time Calculation:   OT Start Time: 1430  OT Stop Time: 1545  OT Time Calculation (min): 75 min  OT Non-Billable Time (min): 10 min     Therapy Suggested Charges     Code   Minutes  Charges    None               Therapy Charges for Today     Code Description Service Date Service Provider Modifiers Qty    74805526798 HC OT LYMPHEDEMA MANAGEMENT-15 MIN 11/12/2018 Eileen Oh OT  1    35483103460 HC OT MANUAL THERAPY EA 15 MIN 11/12/2018 Eileen Oh OT GO 4                      Eileen Oh, OT  11/12/2018

## 2018-11-15 ENCOUNTER — HOSPITAL ENCOUNTER (OUTPATIENT)
Dept: PHYSICAL THERAPY | Facility: HOSPITAL | Age: 73
Setting detail: THERAPIES SERIES
Discharge: HOME | End: 2018-11-15

## 2018-11-15 ENCOUNTER — HOSPITAL ENCOUNTER (OUTPATIENT)
Dept: OCCUPATIONAL THERAPY | Facility: HOSPITAL | Age: 73
Setting detail: THERAPIES SERIES
Discharge: HOME | End: 2018-11-15

## 2018-11-15 DIAGNOSIS — R26.89 BALANCE PROBLEMS: Primary | ICD-10-CM

## 2018-11-15 DIAGNOSIS — I89.0 LYMPHEDEMA OF BOTH LOWER EXTREMITIES: Primary | ICD-10-CM

## 2018-11-15 PROCEDURE — 97140 MANUAL THERAPY 1/> REGIONS: CPT

## 2018-11-15 PROCEDURE — 97110 THERAPEUTIC EXERCISES: CPT

## 2018-11-15 PROCEDURE — 97112 NEUROMUSCULAR REEDUCATION: CPT

## 2018-11-15 PROCEDURE — 97139 UNLISTED THERAPEUTIC PX: CPT

## 2018-11-15 NOTE — THERAPY TREATMENT NOTE
Outpatient Occupational Therapy Lymphedema Treatment Note   [facility]     Patient Name: Layne Case  : 1945  MRN: [id number]  Today's Date: 11/15/2018      Visit Date: 11/15/2018    Patient Active Problem List   Diagnosis   • Abnormal mammogram   • Umbilical hernia   • Umbilical hernia without obstruction or gangrene        Past Medical History:   Diagnosis Date   • Abnormal mammogram    • Anxiety    • Arthritis    • Bray esophagus    • Hypertension         Past Surgical History:   Procedure Laterality Date   • ENDOSCOPY AND COLONOSCOPY  2017   • HYSTERECTOMY           Visit Dx:      ICD-10-CM ICD-9-CM   1. Lymphedema of both lower extremities I89.0 457.1       Lymphedema     Row Name 11/15/18 0900             Subjective Pain    Able to rate subjective pain?  yes  -BC      Pre-Treatment Pain Level  4  -BC      Subjective Pain Comment  My back hurts all the time.  -BC         Lymphedema Assessment    Lymphedema Classification  RLE:;LLE:  -BC         Lymphedema Edema Assessment    Ptting Edema Category  By grade out of 4  -BC      Pitting Edema  + 4/4  -BC         Skin Changes/Observations    Location/Assessment  Lower Extremity  -BC      Lower Extremity Conditions  bilateral:;intact;clean;dry;hairless  -BC      Lower Extremity Color/Pigment  bilateral:;blanchable  -BC         Lymphedema Sensation    Lymphedema Sensation Reports  LLE:;numbness;tingling  -BC      Lymphedema Sensation Tests  light touch;deep touch  -BC      Lymphedema Light Touch  RLE:;LLE:;WNL  -BC      Lymphedema Deep Touch  LUE:;RLE:;WNL  -BC         Lymphedema Measurements    Measurement Type(s)  Quick Girth  -BC      Quick Girth Areas  Lower extremities  -BC         LLE Quick Girth (cm)    Met-heads  24.5 cm  -BC      Mid foot  25.3 cm  -BC      Smallest ankle  23.6 cm  -BC      Largest calf  45.8 cm  -BC      Tib tuberosity  44 cm  -BC      Mid patella  52 cm  -BC      Distal thigh  57.6 cm  -BC      Proximal thigh  64 cm  -BC       Other 1  31.5 cm  -BC      Other 2  45.5 cm  -BC         RLE Quick Girth (cm)    Met-heads  24.6 cm  -BC      Mid foot  25 cm  -BC      Smallest ankle  24.4 cm  -BC      Largest calf  45.3 cm  -BC      Tib tuberosity  42.8 cm  -BC      Mid patella  51.3 cm  -BC      Distal thigh  61.5 cm  -BC      Proximal thigh  68.5 cm  -BC      Other 1  28.7 cm  -BC      Other 2  42 cm  -BC      RLE Quick Girth Total  414.1  -BC         Manual Lymphatic Drainage    Manual Lymphatic Drainage  initial sequence;extremity treatment  -BC      Initial Sequence  abdomen  -BC      Abdomen  superficial  -BC         Compression/Skin Care    Compression/Skin Care  bandaging  -BC      Bandage Layers  soft foam- 1/4 inch;cotton elastic stocking- single layer (comment size);short-stretch bandages (comment size/quantity)  -BC      Bandaging Technique  circumferential/spiral;light compression  -BC      Compression/Skin Care Comments  Compression pump x 30 min BLE  -BC        User Key  (r) = Recorded By, (t) = Taken By, (c) = Cosigned By    Initials Name Provider Type    Eileen Nicole OT Occupational Therapist                  OT Assessment/Plan     Row Name 11/15/18 1054          OT Assessment    Assessment Comments  Pt. positioned supine on mat table for manual lymph drainage, compression pump, skin care and multi layered bandaging.   -BC       User Key  (r) = Recorded By, (t) = Taken By, (c) = Cosigned By    Initials Name Provider Type    Eileen Nicole OT Occupational Therapist                                           Time Calculation:   OT Start Time: 0900  OT Stop Time: 1030  OT Time Calculation (min): 90 min  OT Non-Billable Time (min): 10 min     Therapy Suggested Charges     Code   Minutes Charges    None               Therapy Charges for Today     Code Description Service Date Service Provider Modifiers Qty    97023639672  OT LYMPHEDEMA MANAGEMENT-15 MIN 11/15/2018 Eileen Oh OT  1    [id number]  OT MANUAL THERAPY  EA 15 MIN 11/15/2018 Adriano, Eileen, EMMANUEL GO 5                      Eileen Oh, OT  11/15/2018

## 2018-11-15 NOTE — THERAPY TREATMENT NOTE
Outpatient Physical Therapy Neuro Treatment Note   [facility]     Patient Name: Layne Case  : 1945  MRN: [id number]  Today's Date: 11/15/2018      Visit Date: 11/15/2018    Visit Dx:    ICD-10-CM ICD-9-CM   1. Balance problems R26.89 781.99       Patient Active Problem List   Diagnosis   • Abnormal mammogram   • Umbilical hernia   • Umbilical hernia without obstruction or gangrene               Lymphedema     Row Name 11/15/18 0900             Subjective Pain    Able to rate subjective pain?  yes  -BC      Pre-Treatment Pain Level  4  -BC      Subjective Pain Comment  My back hurts all the time.  -BC         Lymphedema Assessment    Lymphedema Classification  RLE:;LLE:  -BC         Lymphedema Edema Assessment    Ptting Edema Category  By grade out of 4  -BC      Pitting Edema  + 4/4  -BC         Skin Changes/Observations    Location/Assessment  Lower Extremity  -BC      Lower Extremity Conditions  bilateral:;intact;clean;dry;hairless  -BC      Lower Extremity Color/Pigment  bilateral:;blanchable  -BC         Lymphedema Sensation    Lymphedema Sensation Reports  LLE:;numbness;tingling  -BC      Lymphedema Sensation Tests  light touch;deep touch  -BC      Lymphedema Light Touch  RLE:;LLE:;WNL  -BC      Lymphedema Deep Touch  LUE:;RLE:;WNL  -BC         Lymphedema Measurements    Measurement Type(s)  Quick Girth  -BC      Quick Girth Areas  Lower extremities  -BC         LLE Quick Girth (cm)    Met-heads  24.5 cm  -BC      Mid foot  25.3 cm  -BC      Smallest ankle  23.6 cm  -BC      Largest calf  45.8 cm  -BC      Tib tuberosity  44 cm  -BC      Mid patella  52 cm  -BC      Distal thigh  57.6 cm  -BC      Proximal thigh  64 cm  -BC      Other 1  31.5 cm  -BC      Other 2  45.5 cm  -BC         RLE Quick Girth (cm)    Met-heads  24.6 cm  -BC      Mid foot  25 cm  -BC      Smallest ankle  24.4 cm  -BC      Largest calf  45.3 cm  -BC      Tib tuberosity  42.8 cm  -BC      Mid patella  51.3 cm  -BC      Distal  thigh  61.5 cm  -BC      Proximal thigh  68.5 cm  -BC      Other 1  28.7 cm  -BC      Other 2  42 cm  -BC      RLE Quick Girth Total  414.1  -BC         Manual Lymphatic Drainage    Manual Lymphatic Drainage  initial sequence;extremity treatment  -BC      Initial Sequence  abdomen  -BC      Abdomen  superficial  -BC         Compression/Skin Care    Compression/Skin Care  bandaging  -BC      Bandage Layers  soft foam- 1/4 inch;cotton elastic stocking- single layer (comment size);short-stretch bandages (comment size/quantity)  -BC      Bandaging Technique  circumferential/spiral;light compression  -BC      Compression/Skin Care Comments  Compression pump x 30 min BLE  -BC        User Key  (r) = Recorded By, (t) = Taken By, (c) = Cosigned By    Initials Name Provider Type    BC Eileen Oh OT Occupational Therapist              PT Assessment/Plan     Row Name 11/15/18 1041          PT Assessment    Assessment Comments  Tx today started with sitting ther ex and standing balance activities.  Pt was then tested with left and right Nguyễn Hallpike procedure with a postive test on right. Pt then received Eppley maneuver on right followed by 5min rest in sitting.  -RT        PT Plan    PT Plan Comments  Pt responded well to tx today and therapy will re examine next session.  -RT       User Key  (r) = Recorded By, (t) = Taken By, (c) = Cosigned By    Initials Name Provider Type    RT Keon Walker, PT Physical Therapist               Exercises     Row Name 11/15/18 1000 11/15/18 0900          Subjective Comments    Subjective Comments  Pt reports having dizziness with lying down.  -RT  --        Subjective Pain    Able to rate subjective pain?  --  yes  -BC     Pre-Treatment Pain Level  --  4  -BC     Subjective Pain Comment  --  My back hurts all the time.  -BC        Exercise 1    Exercise Name 1  ap 20, ball sq 20, laq 20, knee flex rtb 20, marches 20; Nguyễn Bray pike with right Eppley performed  -RT  --     Cueing 1   Verbal;Tactile  -RT  --     Time 1  25min  -RT  --        Exercise 2    Exercise Name 2  Neuro; st eyes closed, tandem walking and standing, step ups on foam, long stepping, st unsupported balance head turns  -RT  --     Cueing 2  Verbal;Tactile  -RT  --     Time 2  15 min  -RT  --       User Key  (r) = Recorded By, (t) = Taken By, (c) = Cosigned By    Initials Name Provider Type    RT Keon Walker, PT Physical Therapist    [facility]                            Therapy Education  Given: Symptoms/condition management, Fall prevention and home safety, Mobility training, Posture/body mechanics  Program: Reinforced  How Provided: Verbal, Demonstration  Provided to: Patient  Level of Understanding: Verbalized, Demonstrated              Time Calculation:   Start Time: 0800  Stop Time: 0900  Time Calculation (min): 60 min   Therapy Suggested Charges     Code   Minutes Charges    None           Therapy Charges for Today     Code Description Service Date Service Provider Modifiers Qty    25211701441  PT THER PROC EA 15 MIN 11/15/2018 Keon Walker, PT GP 2    [id number]  PT NEUROMUSC RE EDUCATION EA 15 MIN 11/15/2018 Keon Walker, PT GP 1                    Keon Walker PT  11/15/2018

## 2018-11-19 ENCOUNTER — HOSPITAL ENCOUNTER (OUTPATIENT)
Dept: PHYSICAL THERAPY | Facility: HOSPITAL | Age: 73
Setting detail: THERAPIES SERIES
Discharge: HOME | End: 2018-11-19

## 2018-11-19 ENCOUNTER — HOSPITAL ENCOUNTER (OUTPATIENT)
Dept: OCCUPATIONAL THERAPY | Facility: HOSPITAL | Age: 73
Setting detail: THERAPIES SERIES
Discharge: HOME | End: 2018-11-19

## 2018-11-19 DIAGNOSIS — I89.0 LYMPHEDEMA OF BOTH LOWER EXTREMITIES: Primary | ICD-10-CM

## 2018-11-19 DIAGNOSIS — R26.89 BALANCE PROBLEMS: Primary | ICD-10-CM

## 2018-11-19 PROCEDURE — 97140 MANUAL THERAPY 1/> REGIONS: CPT

## 2018-11-19 PROCEDURE — 97112 NEUROMUSCULAR REEDUCATION: CPT

## 2018-11-19 PROCEDURE — 97139 UNLISTED THERAPEUTIC PX: CPT

## 2018-11-19 PROCEDURE — 97110 THERAPEUTIC EXERCISES: CPT

## 2018-11-19 NOTE — THERAPY TREATMENT NOTE
Outpatient Occupational Therapy Lymphedema Treatment Note   [facility]     Patient Name: Layne Case  : 1945  MRN: [id number]  Today's Date: 2018      Visit Date: 2018    Patient Active Problem List   Diagnosis   • Abnormal mammogram   • Umbilical hernia   • Umbilical hernia without obstruction or gangrene        Past Medical History:   Diagnosis Date   • Abnormal mammogram    • Anxiety    • Arthritis    • Bray esophagus    • Hypertension         Past Surgical History:   Procedure Laterality Date   • ENDOSCOPY AND COLONOSCOPY  2017   • HYSTERECTOMY           Visit Dx:      ICD-10-CM ICD-9-CM   1. Lymphedema of both lower extremities I89.0 457.1       Lymphedema     Row Name 18 1400             Subjective Pain    Able to rate subjective pain?  yes  -BC      Pre-Treatment Pain Level  6  -BC         Lymphedema Assessment    Lymphedema Classification  RLE:;LLE:  -BC         Lymphedema Edema Assessment    Ptting Edema Category  By grade out of 4  -BC      Pitting Edema  + 4/4  -BC         Skin Changes/Observations    Location/Assessment  Lower Extremity  -BC      Lower Extremity Conditions  bilateral:;intact;clean;dry;hairless  -BC      Lower Extremity Color/Pigment  bilateral:;blanchable  -BC         Lymphedema Sensation    Lymphedema Sensation Reports  LLE:;numbness;tingling  -BC      Lymphedema Sensation Tests  light touch;deep touch  -BC      Lymphedema Light Touch  RLE:;LLE:;WNL  -BC      Lymphedema Deep Touch  LUE:;RLE:;WNL  -BC         Lymphedema Measurements    Measurement Type(s)  Quick Girth  -BC      Quick Girth Areas  Lower extremities  -BC         LLE Quick Girth (cm)    Met-heads  24.7 cm  -BC      Mid foot  25 cm  -BC      Smallest ankle  23.6 cm  -BC      Largest calf  46.2 cm  -BC      Tib tuberosity  45.1 cm  -BC      Mid patella  51.9 cm  -BC      Distal thigh  59 cm  -BC      Proximal thigh  66.3 cm  -BC      Other 1  32 cm  -BC      Other 2  46.2 cm  -BC         RLE  Quick Girth (cm)    Met-heads  24.4 cm  -BC      Mid foot  25 cm  -BC      Smallest ankle  24.8 cm  -BC      Largest calf  45.5 cm  -BC      Tib tuberosity  42.3 cm  -BC      Mid patella  50 cm  -BC      Distal thigh  60.5 cm  -BC      Proximal thigh  68.3 cm  -BC      Other 1  30.3 cm  -BC      Other 2  42.2 cm  -BC      RLE Quick Girth Total  413.3  -BC         Manual Lymphatic Drainage    Manual Lymphatic Drainage  initial sequence;extremity treatment  -BC      Initial Sequence  abdomen  -BC      Abdomen  superficial  -BC         Compression/Skin Care    Compression/Skin Care  bandaging  -BC      Bandage Layers  soft foam- 1/4 inch;cotton elastic stocking- single layer (comment size);short-stretch bandages (comment size/quantity)  -BC      Bandaging Technique  circumferential/spiral;light compression  -BC      Compression/Skin Care Comments  Compression pump x 30 min.  -BC        User Key  (r) = Recorded By, (t) = Taken By, (c) = Cosigned By    Initials Name Provider Type    Eileen Nicloe OT Occupational Therapist                  OT Assessment/Plan     Row Name 11/19/18 1551          OT Assessment    Assessment Comments  Pt. positioned supine on mat table for manual lymph drainage, compression pump, skin care and multi layered bandaging.    -BC       User Key  (r) = Recorded By, (t) = Taken By, (c) = Cosigned By    Initials Name Provider Type    Eileen Nicole OT Occupational Therapist                                           Time Calculation:   OT Start Time: 1435  OT Stop Time: 1552  OT Time Calculation (min): 77 min  OT Non-Billable Time (min): 10 min     Therapy Suggested Charges     Code   Minutes Charges    None               Therapy Charges for Today     Code Description Service Date Service Provider Modifiers Qty    07497590039  OT LYMPHEDEMA MANAGEMENT-15 MIN 11/19/2018 Eileen Oh OT  1    [id number]  OT MANUAL THERAPY EA 15 MIN 11/19/2018 Eileen Oh OT GO 4                       Eileen Oh, OT  11/19/2018

## 2018-11-19 NOTE — THERAPY TREATMENT NOTE
Outpatient Physical Therapy Neuro Treatment Note   [facility]     Patient Name: Layne Case  : 1945  MRN: [id number]  Today's Date: 2018      Visit Date: 2018    Visit Dx:    ICD-10-CM ICD-9-CM   1. Balance problems R26.89 781.99       Patient Active Problem List   Diagnosis   • Abnormal mammogram   • Umbilical hernia   • Umbilical hernia without obstruction or gangrene               Lymphedema     Row Name 18 1400             Subjective Pain    Able to rate subjective pain?  yes  -BC      Pre-Treatment Pain Level  6  -BC         Lymphedema Assessment    Lymphedema Classification  RLE:;LLE:  -BC         Lymphedema Edema Assessment    Ptting Edema Category  By grade out of 4  -BC      Pitting Edema  + 4/4  -BC         Skin Changes/Observations    Location/Assessment  Lower Extremity  -BC      Lower Extremity Conditions  bilateral:;intact;clean;dry;hairless  -BC      Lower Extremity Color/Pigment  bilateral:;blanchable  -BC         Lymphedema Sensation    Lymphedema Sensation Reports  LLE:;numbness;tingling  -BC      Lymphedema Sensation Tests  light touch;deep touch  -BC      Lymphedema Light Touch  RLE:;LLE:;WNL  -BC      Lymphedema Deep Touch  LUE:;RLE:;WNL  -BC         Lymphedema Measurements    Measurement Type(s)  Quick Girth  -BC      Quick Girth Areas  Lower extremities  -BC         LLE Quick Girth (cm)    Met-heads  24.7 cm  -BC      Mid foot  25 cm  -BC      Smallest ankle  23.6 cm  -BC      Largest calf  46.2 cm  -BC      Tib tuberosity  45.1 cm  -BC      Mid patella  51.9 cm  -BC      Distal thigh  59 cm  -BC      Proximal thigh  66.3 cm  -BC      Other 1  32 cm  -BC      Other 2  46.2 cm  -BC         RLE Quick Girth (cm)    Met-heads  24.4 cm  -BC      Mid foot  25 cm  -BC      Smallest ankle  24.8 cm  -BC      Largest calf  45.5 cm  -BC      Tib tuberosity  42.3 cm  -BC      Mid patella  50 cm  -BC      Distal thigh  60.5 cm  -BC      Proximal thigh  68.3 cm  -BC      Other  1  30.3 cm  -BC      Other 2  42.2 cm  -BC      RLE Quick Girth Total  413.3  -BC         Manual Lymphatic Drainage    Manual Lymphatic Drainage  initial sequence;extremity treatment  -BC      Initial Sequence  abdomen  -BC      Abdomen  superficial  -BC         Compression/Skin Care    Compression/Skin Care  bandaging  -BC      Bandage Layers  soft foam- 1/4 inch;cotton elastic stocking- single layer (comment size);short-stretch bandages (comment size/quantity)  -BC      Bandaging Technique  circumferential/spiral;light compression  -BC        User Key  (r) = Recorded By, (t) = Taken By, (c) = Cosigned By    Initials Name Provider Type    BC Eileen Oh OT Occupational Therapist              PT Assessment/Plan     Row Name 11/19/18 1537          PT Assessment    Assessment Comments  Tx today consisted of ther ex and neuro per flow sheet and ended with Eppley maneuver on right.  Pt reported a 50% decrease in dizziness following last session.  Pt responded well to added balance activities today.   -RT        PT Plan    PT Plan Comments  Will follow progressing balance and gait.  -RT       User Key  (r) = Recorded By, (t) = Taken By, (c) = Cosigned By    Initials Name Provider Type    RT Keon Walker, PT Physical Therapist               Exercises     Row Name 11/19/18 1500 11/19/18 1400          Subjective Comments    Subjective Comments  Pt report having a 50% decrease in dizziness.  Pt reports having increased left TFL pain.  -RT  --        Subjective Pain    Able to rate subjective pain?  yes  -RT  yes  -BC     Pre-Treatment Pain Level  4  -RT  6  -BC     Post-Treatment Pain Level  4  -RT  --        Exercise 1    Exercise Name 1  ap 25, ball sq 25, laq 25, knee flex rtb, marches 25, Eppley right  -RT  --     Cueing 1  Verbal;Tactile  -RT  --     Time 1  25min  -RT  --        Exercise 2    Exercise Name 2  Neuro; tandem walking, side stepping foam, wobble board, step ups foam, st eyes closed, long stepping   -RT  --     Cueing 2  Verbal;Tactile  -RT  --     Time 2  15 min  -RT  --       User Key  (r) = Recorded By, (t) = Taken By, (c) = Cosigned By    Initials Name Provider Type    RT Keon Walker, PT Physical Therapist    [facility]                            Therapy Education  Given: Symptoms/condition management, Fall prevention and home safety, Mobility training, Posture/body mechanics  Program: Reinforced  How Provided: Verbal, Demonstration  Provided to: Patient  Level of Understanding: Verbalized, Demonstrated              Time Calculation:   Start Time: 1300  Stop Time: 1355  Time Calculation (min): 55 min   Therapy Suggested Charges     Code   Minutes Charges    None           Therapy Charges for Today     Code Description Service Date Service Provider Modifiers Qty    11155140453 HC PT THER PROC EA 15 MIN 11/19/2018 Keon Walker, PT GP 2    [id number] HC PT NEUROMUSC RE EDUCATION EA 15 MIN 11/19/2018 Keon Walker, PT GP 1                    Keon Walker, PT  11/19/2018

## 2018-11-21 ENCOUNTER — HOSPITAL ENCOUNTER (OUTPATIENT)
Dept: OCCUPATIONAL THERAPY | Facility: HOSPITAL | Age: 73
Setting detail: THERAPIES SERIES
Discharge: HOME | End: 2018-11-21

## 2018-11-21 ENCOUNTER — HOSPITAL ENCOUNTER (OUTPATIENT)
Dept: PHYSICAL THERAPY | Facility: HOSPITAL | Age: 73
Setting detail: THERAPIES SERIES
Discharge: HOME | End: 2018-11-21

## 2018-11-21 DIAGNOSIS — I89.0 LYMPHEDEMA OF BOTH LOWER EXTREMITIES: Primary | ICD-10-CM

## 2018-11-21 DIAGNOSIS — R26.89 BALANCE PROBLEMS: Primary | ICD-10-CM

## 2018-11-21 PROCEDURE — 97140 MANUAL THERAPY 1/> REGIONS: CPT

## 2018-11-21 PROCEDURE — 97139 UNLISTED THERAPEUTIC PX: CPT

## 2018-11-21 PROCEDURE — 97110 THERAPEUTIC EXERCISES: CPT

## 2018-11-21 NOTE — THERAPY TREATMENT NOTE
Outpatient Occupational Therapy Lymphedema Treatment Note   [facility]     Patient Name: Layne Case  : 1945  MRN: [id number]  Today's Date: 2018      Visit Date: 2018    Patient Active Problem List   Diagnosis   • Abnormal mammogram   • Umbilical hernia   • Umbilical hernia without obstruction or gangrene        Past Medical History:   Diagnosis Date   • Abnormal mammogram    • Anxiety    • Arthritis    • Bray esophagus    • Hypertension         Past Surgical History:   Procedure Laterality Date   • ENDOSCOPY AND COLONOSCOPY  2017   • HYSTERECTOMY           Visit Dx:      ICD-10-CM ICD-9-CM   1. Lymphedema of both lower extremities I89.0 457.1       Lymphedema     Row Name 18 1400             Subjective Pain    Able to rate subjective pain?  yes  -BC      Pre-Treatment Pain Level  6  -BC      Subjective Pain Comment  My back and leg.  -BC         Subjective Comments    Subjective Comments  Pt. reports its her back and leg that hurts her the most.    -BC         Lymphedema Assessment    Lymphedema Classification  RLE:;LLE:  -BC         Lymphedema Edema Assessment    Ptting Edema Category  By grade out of 4  -BC      Pitting Edema  + 4/4  -BC         Skin Changes/Observations    Location/Assessment  Lower Extremity  -BC      Lower Extremity Conditions  bilateral:;intact;clean;dry;hairless  -BC      Lower Extremity Color/Pigment  bilateral:;blanchable  -BC         Lymphedema Sensation    Lymphedema Sensation Reports  LLE:;numbness;tingling  -BC      Lymphedema Sensation Tests  light touch;deep touch  -BC      Lymphedema Light Touch  RLE:;LLE:;WNL  -BC      Lymphedema Deep Touch  LUE:;RLE:;WNL  -BC         Lymphedema Measurements    Measurement Type(s)  Quick Girth  -BC      Quick Girth Areas  Lower extremities  -BC         LLE Quick Girth (cm)    Met-heads  24.8 cm  -BC      Mid foot  25.1 cm  -BC      Smallest ankle  23 cm  -BC      Largest calf  46 cm  -BC      Tib tuberosity  44.4 cm   -BC      Mid patella  52.2 cm  -BC      Distal thigh  60 cm  -BC      Proximal thigh  64.5 cm  -BC      Other 1  32.2 cm  -BC      Other 2  46.2 cm  -BC         RLE Quick Girth (cm)    Met-heads  24 cm  -BC      Mid foot  24.8 cm  -BC      Smallest ankle  24.6 cm  -BC      Largest calf  44.5 cm  -BC      Tib tuberosity  42.7 cm  -BC      Mid patella  50.4 cm  -BC      Distal thigh  61.5 cm  -BC      Proximal thigh  69.2 cm  -BC      Other 1  30.3 cm  -BC      Other 2  42.8 cm  -BC      RLE Quick Girth Total  414.8  -BC         Manual Lymphatic Drainage    Manual Lymphatic Drainage  initial sequence;extremity treatment  -BC      Initial Sequence  abdomen  -BC      Abdomen  superficial  -BC         Compression/Skin Care    Compression/Skin Care  bandaging  -BC      Bandage Layers  soft foam- 1/4 inch;cotton elastic stocking- single layer (comment size);short-stretch bandages (comment size/quantity)  -BC      Bandaging Technique  circumferential/spiral;light compression  -BC      Compression/Skin Care Comments  Compression pump x 30 min.   -BC        User Key  (r) = Recorded By, (t) = Taken By, (c) = Cosigned By    Initials Name Provider Type    Eileen Nicole, OT Occupational Therapist                  OT Assessment/Plan     Row Name 11/21/18 1530          OT Assessment    Assessment Comments  pt. positioned supine on mat table for manual lymph drainage, compression pump, skin care and multi layered bandages.   -BC       User Key  (r) = Recorded By, (t) = Taken By, (c) = Cosigned By    Initials Name Provider Type    Eileen Nicole OT Occupational Therapist                                           Time Calculation:   OT Start Time: 1430  OT Stop Time: 1548  OT Time Calculation (min): 78 min     Therapy Suggested Charges     Code   Minutes Charges    None               Therapy Charges for Today     Code Description Service Date Service Provider Modifiers Qty    [id number]  OT LYMPHEDEMA MANAGEMENT-15 MIN  11/21/2018 Eileen Oh OT  1    [id number] HC OT MANUAL THERAPY EA 15 MIN 11/21/2018 Eileen Oh OT  4                      Eileen Oh, EMMANUEL  11/21/2018

## 2018-11-21 NOTE — THERAPY DISCHARGE NOTE
Outpatient Physical Therapy Neuro Treatment Note/Discharge Summary   D[facility]     Patient Name: Layne Case  : 1945  MRN: [id number]  Today's Date: 2018      Visit Date: 2018    Visit Dx:    ICD-10-CM ICD-9-CM   1. Balance problems R26.89 781.99       Patient Active Problem List   Diagnosis   • Abnormal mammogram   • Umbilical hernia   • Umbilical hernia without obstruction or gangrene                Lymphedema     Row Name 18 1400             Subjective Pain    Able to rate subjective pain?  yes  -BC      Pre-Treatment Pain Level  6  -BC      Subjective Pain Comment  My back and leg.  -BC         Subjective Comments    Subjective Comments  Pt. reports its her back and leg that hurts her the most.    -BC         Lymphedema Assessment    Lymphedema Classification  RLE:;LLE:  -BC         Lymphedema Edema Assessment    Ptting Edema Category  By grade out of 4  -BC      Pitting Edema  + 4/4  -BC         Skin Changes/Observations    Location/Assessment  Lower Extremity  -BC      Lower Extremity Conditions  bilateral:;intact;clean;dry;hairless  -BC      Lower Extremity Color/Pigment  bilateral:;blanchable  -BC         Lymphedema Sensation    Lymphedema Sensation Reports  LLE:;numbness;tingling  -BC      Lymphedema Sensation Tests  light touch;deep touch  -BC      Lymphedema Light Touch  RLE:;LLE:;WNL  -BC      Lymphedema Deep Touch  LUE:;RLE:;WNL  -BC         Lymphedema Measurements    Measurement Type(s)  Quick Girth  -BC      Quick Girth Areas  Lower extremities  -BC         LLE Quick Girth (cm)    Met-heads  24.8 cm  -BC      Mid foot  25.1 cm  -BC      Smallest ankle  23 cm  -BC      Largest calf  46 cm  -BC      Tib tuberosity  44.4 cm  -BC      Mid patella  52.2 cm  -BC      Distal thigh  60 cm  -BC      Proximal thigh  64.5 cm  -BC      Other 1  32.2 cm  -BC      Other 2  46.2 cm  -BC         Manual Lymphatic Drainage    Manual Lymphatic Drainage  initial sequence;extremity  treatment  -BC      Initial Sequence  abdomen  -BC      Abdomen  superficial  -BC         Compression/Skin Care    Compression/Skin Care  bandaging  -BC      Bandage Layers  soft foam- 1/4 inch;cotton elastic stocking- single layer (comment size);short-stretch bandages (comment size/quantity)  -BC      Bandaging Technique  circumferential/spiral;light compression  -BC        User Key  (r) = Recorded By, (t) = Taken By, (c) = Cosigned By    Initials Name Provider Type    BC Eileen Oh, OT Occupational Therapist              PT Assessment/Plan     Row Name 11/21/18 6671          PT Assessment    Assessment Comments  Tx today consisted of mh to back with exercises followed by review of HEP and balance activities.  Pt reports a 75% decrease in dizziness.  -RT        PT Plan    PT Plan Comments  See discharge.  -RT       User Key  (r) = Recorded By, (t) = Taken By, (c) = Cosigned By    Initials Name Provider Type    RT Koen Walker, PT Physical Therapist               Exercises     Row Name 11/21/18 1400 11/21/18 1400          Subjective Comments    Subjective Comments  Pt. reports its her back and leg that hurts her the most.    -BC  PT reports having a 75% improvement with balance and reports increased back and leg pain.  -RT        Subjective Pain    Able to rate subjective pain?  yes  -BC  yes  -RT     Pre-Treatment Pain Level  6  -BC  5  -RT     Post-Treatment Pain Level  --  5  -RT     Subjective Pain Comment  My back and leg.  -BC  --        Exercise 1    Exercise Name 1  --  DC review; NINO testing, ap 30, ball sq 30, laq 30, knee flex rtb 30, marches 30  -RT     Cueing 1  --  Verbal;Tactile  -RT     Time 1  --  25min  -RT       User Key  (r) = Recorded By, (t) = Taken By, (c) = Cosigned By    Initials Name Provider Type    RT Keon Walker, PT Physical Therapist    BC Eileen Oh, OT Occupational Therapist                        PT OP Goals     Row Name 11/21/18 1400          PT Short Term Goals     STG Date to Achieve  11/26/18  -RT     STG 1  Patient will be independent/compliant with HEP.  -RT     STG 1 Progress  Met  -RT     STG 2  NINO will improve to at least 42/56 to show improved functional mobility.  -RT     STG 2 Progress  Progressing  -RT     STG 3  Patient will be able to perform feet together, eyes closed for 10 seconds with minimal postural sway.  -RT     STG 3 Progress  Met  -RT     STG 4  Patient will be able to perform tandem stance for 5 seconds.  -RT     STG 4 Progress  Met  -RT        Long Term Goals    LTG Date to Achieve  12/12/18  -RT     LTG 1  LE strength will improve to at least 4+/5 to prevent injury.  -RT     LTG 1 Progress  Met  -RT     LTG 2  NINO will imrpove to at least 46/56 to show improved functional mobility and decreased fall risk.  -RT     LTG 2 Progress  Not Met  -RT     LTG 3  Patient will be able to perform tandem stance for 10 seconds.  -RT     LTG 3 Progress  Partially Met  -RT     LTG 4  Patient will be able to perform SLS for 5 seconds to show improved balance.  -RT     LTG 4 Progress  Not Met  -RT       User Key  (r) = Recorded By, (t) = Taken By, (c) = Cosigned By    Initials Name Provider Type    RT Keon Walker, PT Physical Therapist          Therapy Education  Given: Symptoms/condition management, Fall prevention and home safety, Mobility training, Posture/body mechanics  Program: Reinforced  How Provided: Verbal, Demonstration  Provided to: Patient  Level of Understanding: Verbalized, Demonstrated    Nino Balance Scale  Sitting to Standing: able to stand without using hands and stabilize independently  Standing Unsupported: able to stand safely for 2 minutes  Sitting with Back Unsupported but Feet Supported on Floor or on Stool: able to sit safely and securely for 2 minutes  Standing to Sitting: sits safely with minimal use of hands  Transfers: able to transfer safely with minor use of hands  Standing Unsupported with Eyes Closed: able to stand 10  seconds with supervision  Standing Unsupported with Feet Together: able to place feet together independently and stand 1 minute with supervision  Reaching Forward with Outstretched Arm While Standing: can reach forward 12 cm (5 inches)   Object From the Floor From a Standing Position: able to  object but needs supervision  Turning to Look Behind Over Left and Right Shoulders While Standing: looks behind from both sides and weight shifts well  Turn 360 Degrees: able to turn 360 degrees safely but slowly  Place Alternate Foot on Step or Stool While Standing Unsupported: able to complete 4 steps without aid with supervision  Standing Unsupported with One Foot in Front: loses balance while stepping or standing  Standing on One Leg: unable to try of needs assist to prevent fall  San Total Score: 40       Time Calculation:   Start Time: 1302  Stop Time: 1329  Time Calculation (min): 27 min   Therapy Suggested Charges     Code   Minutes Charges    None           Therapy Charges for Today     Code Description Service Date Service Provider Modifiers Qty    12925325311 HC PT THER PROC EA 15 MIN 11/21/2018 Keon Walker, PT GP 2          PT G-Codes  San Total Score: 40     OP PT Discharge Summary  Date of Discharge: 11/21/18  Reason for Discharge: Maximum functional potential achieved  Outcomes Achieved: Patient able to partially acheive established goals  Discharge Destination: Home with home program  Discharge Instructions/Additional Comments: Pt has reported a 75% decrease in dizziness and was instructed in a HEP.  Pt now reports increased back and left leg into the foot region.  Pt has been advised to follow up with MD for lumbar examination.  Pt has been advised to contact therapy as needed.        Keon Walker PT  11/21/2018

## 2018-11-26 ENCOUNTER — HOSPITAL ENCOUNTER (OUTPATIENT)
Dept: OCCUPATIONAL THERAPY | Facility: HOSPITAL | Age: 73
Setting detail: THERAPIES SERIES
Discharge: HOME | End: 2018-11-26

## 2018-11-26 DIAGNOSIS — I89.0 LYMPHEDEMA OF BOTH LOWER EXTREMITIES: Primary | ICD-10-CM

## 2018-11-26 PROCEDURE — 97140 MANUAL THERAPY 1/> REGIONS: CPT

## 2018-11-26 PROCEDURE — 97139 UNLISTED THERAPEUTIC PX: CPT

## 2018-11-26 NOTE — THERAPY TREATMENT NOTE
Outpatient Occupational Therapy Lymphedema Treatment Note  JOSEILNE Ch     Patient Name: Layne Case  : 1945  MRN: [id number]  Today's Date: 2018      Visit Date: 2018    Patient Active Problem List   Diagnosis   • Abnormal mammogram   • Umbilical hernia   • Umbilical hernia without obstruction or gangrene        Past Medical History:   Diagnosis Date   • Abnormal mammogram    • Anxiety    • Arthritis    • Bray esophagus    • Hypertension         Past Surgical History:   Procedure Laterality Date   • ENDOSCOPY AND COLONOSCOPY  2017   • HYSTERECTOMY           Visit Dx:      ICD-10-CM ICD-9-CM   1. Lymphedema of both lower extremities I89.0 457.1       Lymphedema     Row Name 18 1400             Subjective Pain    Able to rate subjective pain?  yes  -BC      Pre-Treatment Pain Level  6  -BC      Subjective Pain Comment  My back and my hip and my leg are just killing me.   -BC         Lymphedema Assessment    Lymphedema Classification  RLE:;LLE:  -BC         Lymphedema Edema Assessment    Ptting Edema Category  By grade out of 4  -BC      Pitting Edema  + 4/4  -BC         Skin Changes/Observations    Location/Assessment  Lower Extremity  -BC      Lower Extremity Conditions  bilateral:;intact;clean;dry;hairless  -BC      Lower Extremity Color/Pigment  bilateral:;blanchable  -BC         Lymphedema Sensation    Lymphedema Sensation Reports  LLE:;numbness;tingling  -BC      Lymphedema Sensation Tests  light touch;deep touch  -BC      Lymphedema Light Touch  RLE:;LLE:;WNL  -BC      Lymphedema Deep Touch  LUE:;RLE:;WNL  -BC         Lymphedema Measurements    Measurement Type(s)  Quick Girth  -BC      Quick Girth Areas  Lower extremities  -BC         LLE Quick Girth (cm)    Met-heads  25 cm  -BC      Mid foot  26 cm  -BC      Smallest ankle  23.4 cm  -BC      Largest calf  47.4 cm  -BC      Tib tuberosity  44 cm  -BC      Mid patella  51.8 cm  -BC      Distal thigh  59.3 cm  -BC      Proximal  thigh  66.7 cm  -BC      Other 1  32.4 cm  -BC      Other 2  45.4 cm  -BC         RLE Quick Girth (cm)    Met-heads  24.5 cm  -BC      Mid foot  25.5 cm  -BC      Smallest ankle  25 cm  -BC      Largest calf  46.2 cm  -BC      Tib tuberosity  42.1 cm  -BC      Mid patella  51.2 cm  -BC      Distal thigh  62.5 cm  -BC      Proximal thigh  68.5 cm  -BC      Other 1  30 cm  -BC      Other 2  42.2 cm  -BC      RLE Quick Girth Total  417.7  -BC         Manual Lymphatic Drainage    Manual Lymphatic Drainage  initial sequence;extremity treatment  -BC      Initial Sequence  abdomen  -BC      Abdomen  superficial  -BC         Compression/Skin Care    Compression/Skin Care  bandaging  -BC      Bandage Layers  soft foam- 1/4 inch;cotton elastic stocking- single layer (comment size);short-stretch bandages (comment size/quantity)  -BC      Bandaging Technique  circumferential/spiral;light compression  -BC      Compression/Skin Care Comments  Coimpression pump x 30 min.   -BC        User Key  (r) = Recorded By, (t) = Taken By, (c) = Cosigned By    Initials Name Provider Type    Eileen Nicole OT Occupational Therapist                  OT Assessment/Plan     Row Name 11/26/18 1614          OT Assessment    Assessment Comments  Pt. positioned supine on mat table for manual lymph drainage, compression pump, skin care and multi layered bandaging.   -BC       User Key  (r) = Recorded By, (t) = Taken By, (c) = Cosigned By    Initials Name Provider Type    Eileen Nicole OT Occupational Therapist                                           Time Calculation:   OT Start Time: 1445  OT Stop Time: 1600  OT Time Calculation (min): 75 min  OT Non-Billable Time (min): 10 min     Therapy Suggested Charges     Code   Minutes Charges    None               Therapy Charges for Today     Code Description Service Date Service Provider Modifiers Qty    62019587351  OT LYMPHEDEMA MANAGEMENT-15 MIN 11/26/2018 Eileen Oh OT  1     [id number]  OT MANUAL THERAPY EA 15 MIN 11/26/2018 Eileen Oh, EMMANUEL GO 4                      Eileen Oh, OT  11/26/2018

## 2018-11-28 ENCOUNTER — HOSPITAL ENCOUNTER (OUTPATIENT)
Dept: OCCUPATIONAL THERAPY | Facility: HOSPITAL | Age: 73
Setting detail: THERAPIES SERIES
Discharge: HOME | End: 2018-11-28

## 2018-11-28 DIAGNOSIS — I89.0 LYMPHEDEMA OF BOTH LOWER EXTREMITIES: Primary | ICD-10-CM

## 2018-11-28 PROCEDURE — 97139 UNLISTED THERAPEUTIC PX: CPT

## 2018-11-28 PROCEDURE — 97140 MANUAL THERAPY 1/> REGIONS: CPT

## 2018-11-28 NOTE — THERAPY TREATMENT NOTE
Outpatient Occupational Therapy Lymphedema Treatment Note  JOSELINE Ch     Patient Name: Layne Case  : 1945  MRN: [id number]  Today's Date: 2018      Visit Date: 2018    Patient Active Problem List   Diagnosis   • Abnormal mammogram   • Umbilical hernia   • Umbilical hernia without obstruction or gangrene        Past Medical History:   Diagnosis Date   • Abnormal mammogram    • Anxiety    • Arthritis    • Bray esophagus    • Hypertension         Past Surgical History:   Procedure Laterality Date   • ENDOSCOPY AND COLONOSCOPY  2017   • HYSTERECTOMY           Visit Dx:      ICD-10-CM ICD-9-CM   1. Lymphedema of both lower extremities I89.0 457.1       Lymphedema     Row Name 18 1500             Subjective Pain    Able to rate subjective pain?  yes  -BC      Pre-Treatment Pain Level  5  -BC      Subjective Pain Comment  I wished I could a cortisone  shot in my hip.   -BC         Lymphedema Assessment    Lymphedema Classification  RLE:;LLE:  -BC         Lymphedema Edema Assessment    Ptting Edema Category  By grade out of 4  -BC      Pitting Edema  + 4/4  -BC         Skin Changes/Observations    Location/Assessment  Lower Extremity  -BC      Lower Extremity Conditions  bilateral:;intact;clean;dry;hairless  -BC      Lower Extremity Color/Pigment  bilateral:;blanchable  -BC         Lymphedema Sensation    Lymphedema Sensation Reports  LLE:;numbness;tingling  -BC      Lymphedema Sensation Tests  light touch;deep touch  -BC      Lymphedema Light Touch  RLE:;LLE:;WNL  -BC      Lymphedema Deep Touch  LUE:;RLE:;WNL  -BC         Lymphedema Measurements    Measurement Type(s)  Quick Girth  -BC      Quick Girth Areas  Lower extremities  -BC         LLE Quick Girth (cm)    Met-heads  24.3 cm  -BC      Mid foot  25.6 cm  -BC      Smallest ankle  22.5 cm  -BC      Largest calf  45.5 cm  -BC      Tib tuberosity  44.5 cm  -BC      Mid patella  53.2 cm  -BC      Distal thigh  58.6 cm  -BC      Proximal  thigh  65.5 cm  -BC      Other 1  30.8 cm  -BC      Other 2  43 cm  -BC         RLE Quick Girth (cm)    Met-heads  24.8 cm  -BC      Mid foot  24.3 cm  -BC      Smallest ankle  24.2 cm  -BC      Largest calf  43.4 cm  -BC      Tib tuberosity  42 cm  -BC      Mid patella  50 cm  -BC      Distal thigh  60.8 cm  -BC      Proximal thigh  68 cm  -BC      Other 1  29.2 cm  -BC      Other 2  40.4 cm  -BC      RLE Quick Girth Total  407.1  -BC         Manual Lymphatic Drainage    Manual Lymphatic Drainage  initial sequence;extremity treatment  -BC      Initial Sequence  abdomen  -BC      Abdomen  superficial  -BC         Compression/Skin Care    Compression/Skin Care  bandaging  -BC      Bandage Layers  soft foam- 1/4 inch;cotton elastic stocking- single layer (comment size);short-stretch bandages (comment size/quantity)  -BC      Bandaging Technique  circumferential/spiral;light compression  -BC      Compression/Skin Care Comments  Compression pump x 30 BLE  -BC        User Key  (r) = Recorded By, (t) = Taken By, (c) = Cosigned By    Initials Name Provider Type    Eileen Nicole OT Occupational Therapist                  OT Assessment/Plan     Row Name 11/28/18 1639          OT Assessment    OT Diagnosis  Pt. positioned supine on mat table for manual lymph drainage, compression pump, skin care and multi layered bandaging.   -BC       User Key  (r) = Recorded By, (t) = Taken By, (c) = Cosigned By    Initials Name Provider Type    Eileen Nicole OT Occupational Therapist                                           Time Calculation:   OT Start Time: 1445  OT Stop Time: 1600  OT Time Calculation (min): 75 min  OT Non-Billable Time (min): 10 min     Therapy Suggested Charges     Code   Minutes Charges    None               Therapy Charges for Today     Code Description Service Date Service Provider Modifiers Qty    [id number]  OT LYMPHEDEMA MANAGEMENT-15 MIN 11/28/2018 Eileen Oh   1    [id number]  OT  MANUAL THERAPY EA 15 MIN 11/28/2018 Adriano, Eileen, EMMANUEL GO 4                      Eileen Oh, OT  11/28/2018

## 2018-12-03 ENCOUNTER — HOSPITAL ENCOUNTER (OUTPATIENT)
Dept: OCCUPATIONAL THERAPY | Facility: HOSPITAL | Age: 73
Setting detail: THERAPIES SERIES
Discharge: HOME | End: 2018-12-03

## 2018-12-03 DIAGNOSIS — I89.0 LYMPHEDEMA OF BOTH LOWER EXTREMITIES: Primary | ICD-10-CM

## 2018-12-03 PROCEDURE — 97140 MANUAL THERAPY 1/> REGIONS: CPT

## 2018-12-03 PROCEDURE — 97139 UNLISTED THERAPEUTIC PX: CPT

## 2018-12-03 NOTE — THERAPY TREATMENT NOTE
Outpatient Occupational Therapy Lymphedema Treatment Note   [facility]     Patient Name: Layne Case  : 1945  MRN: [id number]  Today's Date: 12/3/2018      Visit Date: 2018    Patient Active Problem List   Diagnosis   • Abnormal mammogram   • Umbilical hernia   • Umbilical hernia without obstruction or gangrene        Past Medical History:   Diagnosis Date   • Abnormal mammogram    • Anxiety    • Arthritis    • Bray esophagus    • Hypertension         Past Surgical History:   Procedure Laterality Date   • ENDOSCOPY AND COLONOSCOPY  2017   • HYSTERECTOMY           Visit Dx:      ICD-10-CM ICD-9-CM   1. Lymphedema of both lower extremities I89.0 457.1       Lymphedema     Row Name 18 1300             Subjective Pain    Able to rate subjective pain?  yes  -BC      Pre-Treatment Pain Level  5  -BC      Subjective Pain Comment  My hip and my leg are always hurting.   -BC         Lymphedema Assessment    Lymphedema Classification  RLE:;LLE:  -BC         Lymphedema Edema Assessment    Ptting Edema Category  By grade out of 4  -BC      Pitting Edema  + 4/4  -BC         Skin Changes/Observations    Location/Assessment  Lower Extremity  -BC      Lower Extremity Conditions  bilateral:;intact;clean;dry;hairless  -BC      Lower Extremity Color/Pigment  bilateral:;blanchable  -BC         Lymphedema Sensation    Lymphedema Sensation Reports  LLE:;numbness;tingling  -BC      Lymphedema Sensation Tests  light touch;deep touch  -BC      Lymphedema Light Touch  RLE:;LLE:;WNL  -BC      Lymphedema Deep Touch  LUE:;RLE:;WNL  -BC         Lymphedema Measurements    Measurement Type(s)  Quick Girth  -BC      Quick Girth Areas  Lower extremities  -BC         LLE Quick Girth (cm)    Met-heads  25.4 cm  -BC      Mid foot  26 cm  -BC      Smallest ankle  23.5 cm  -BC      Largest calf  46 cm  -BC      Tib tuberosity  42.9 cm  -BC      Mid patella  51.9 cm  -BC      Distal thigh  61 cm  -BC      Proximal thigh  66.2 cm   -BC      Other 1  30.8 cm  -BC      Other 2  44.5 cm  -BC         RLE Quick Girth (cm)    Met-heads  24.7 cm  -BC      Mid foot  25.1 cm  -BC      Smallest ankle  24.1 cm  -BC      Largest calf  44.5 cm  -BC      Tib tuberosity  42 cm  -BC      Mid patella  49.9 cm  -BC      Distal thigh  59 cm  -BC      Proximal thigh  67.5 cm  -BC      Other 1  29.5 cm  -BC      Other 2  42.8 cm  -BC      RLE Quick Girth Total  409.1  -BC         Manual Lymphatic Drainage    Manual Lymphatic Drainage  initial sequence;extremity treatment  -BC      Initial Sequence  abdomen  -BC      Abdomen  superficial  -BC         Compression/Skin Care    Compression/Skin Care  bandaging  -BC      Bandage Layers  soft foam- 1/4 inch;cotton elastic stocking- single layer (comment size);short-stretch bandages (comment size/quantity)  -BC      Bandaging Technique  circumferential/spiral;light compression  -BC      Compression/Skin Care Comments  Compression pump x 30 BLE  -BC        User Key  (r) = Recorded By, (t) = Taken By, (c) = Cosigned By    Initials Name Provider Type    Eileen Nicole OT Occupational Therapist                  OT Assessment/Plan     Row Name 12/03/18 1446          OT Assessment    Assessment Comments  Pt. positioned supine on mat table for manual lymph drainage, compression pump, skin care and multi layered bandaging, donning/doffing trials completed with patient purchased garments, multiple trials with different sock aids to A with donning with limited success.  It was determined that stockings are too long for patient increasing risk of binding at /below knee.   -BC       User Key  (r) = Recorded By, (t) = Taken By, (c) = Cosigned By    Initials Name Provider Type    Eileen Niocle, OT Occupational Therapist                                           Time Calculation:   OT Start Time: 1300  OT Stop Time: 1445  OT Time Calculation (min): 105 min  OT Non-Billable Time (min): 10 min     Therapy Suggested Charges      Code   Minutes Charges    None               Therapy Charges for Today     Code Description Service Date Service Provider Modifiers Qty    84400938750 HC OT LYMPHEDEMA MANAGEMENT-15 MIN 12/3/2018 Eileen Oh OT  1    [id number] HC OT MANUAL THERAPY EA 15 MIN 12/3/2018 Eileen Oh OT GO 5                      Eileen Oh, OT  12/3/2018

## 2018-12-10 ENCOUNTER — HOSPITAL ENCOUNTER (OUTPATIENT)
Dept: OCCUPATIONAL THERAPY | Facility: HOSPITAL | Age: 73
Setting detail: THERAPIES SERIES
Discharge: HOME | End: 2018-12-10

## 2018-12-10 DIAGNOSIS — I89.0 LYMPHEDEMA OF BOTH LOWER EXTREMITIES: Primary | ICD-10-CM

## 2018-12-10 PROCEDURE — 97139 UNLISTED THERAPEUTIC PX: CPT

## 2018-12-10 PROCEDURE — 97140 MANUAL THERAPY 1/> REGIONS: CPT

## 2018-12-10 NOTE — THERAPY TREATMENT NOTE
Outpatient Occupational Therapy Lymphedema Treatment Note   [facility]     Patient Name: Layne Case  : 1945  MRN: [id number]  Today's Date: 12/10/2018      Visit Date: 12/10/2018    Patient Active Problem List   Diagnosis   • Abnormal mammogram   • Umbilical hernia   • Umbilical hernia without obstruction or gangrene        Past Medical History:   Diagnosis Date   • Abnormal mammogram    • Anxiety    • Arthritis    • Bray esophagus    • Hypertension         Past Surgical History:   Procedure Laterality Date   • ENDOSCOPY AND COLONOSCOPY  2017   • HYSTERECTOMY           Visit Dx:      ICD-10-CM ICD-9-CM   1. Lymphedema of both lower extremities I89.0 457.1       Lymphedema     Row Name 12/10/18 1300             Subjective Pain    Able to rate subjective pain?  yes  -BC      Pre-Treatment Pain Level  4  -BC      Subjective Pain Comment  It doesnt hurt when I am just laying here it doesn't bother me as bad.  -BC         Lymphedema Assessment    Lymphedema Classification  RLE:;LLE:  -BC         Lymphedema Edema Assessment    Ptting Edema Category  By grade out of 4  -BC      Pitting Edema  + 4/4  -BC         Skin Changes/Observations    Location/Assessment  Lower Extremity  -BC      Lower Extremity Conditions  bilateral:;intact;clean;dry;hairless  -BC      Lower Extremity Color/Pigment  bilateral:;blanchable  -BC         Lymphedema Sensation    Lymphedema Sensation Reports  LLE:;numbness;tingling  -BC      Lymphedema Sensation Tests  light touch;deep touch  -BC      Lymphedema Light Touch  RLE:;LLE:;WNL  -BC      Lymphedema Deep Touch  LUE:;RLE:;WNL  -BC         Lymphedema Measurements    Measurement Type(s)  Quick Girth  -BC      Quick Girth Areas  Lower extremities  -BC         LLE Quick Girth (cm)    Met-heads  24.7 cm  -BC      Mid foot  25.4 cm  -BC      Smallest ankle  22.6 cm  -BC      Largest calf  45.5 cm  -BC      Tib tuberosity  43.5 cm  -BC      Mid patella  52.4 cm  -BC      Distal thigh   60.7 cm  -BC      Proximal thigh  66 cm  -BC      Other 1  30.6 cm  -BC      Other 2  43.8 cm  -BC         RLE Quick Girth (cm)    Met-heads  24.2 cm  -BC      Mid foot  24.5 cm  -BC      Smallest ankle  23.8 cm  -BC      Largest calf  44 cm  -BC      Tib tuberosity  42.4 cm  -BC      Mid patella  49.7 cm  -BC      Distal thigh  62.7 cm  -BC      Proximal thigh  67.6 cm  -BC      Other 1  30 cm  -BC      Other 2  42.3 cm  -BC      RLE Quick Girth Total  411.2  -BC         Manual Lymphatic Drainage    Manual Lymphatic Drainage  initial sequence;extremity treatment  -BC      Initial Sequence  abdomen  -BC      Abdomen  superficial  -BC         Compression/Skin Care    Compression/Skin Care  bandaging  -BC      Bandage Layers  soft foam- 1/4 inch;cotton elastic stocking- single layer (comment size);short-stretch bandages (comment size/quantity)  -BC      Bandaging Technique  circumferential/spiral;light compression  -BC      Compression/Skin Care Comments  Compression pump x 30 min.  -BC        User Key  (r) = Recorded By, (t) = Taken By, (c) = Cosigned By    Initials Name Provider Type    BC Eileen Oh, OT Occupational Therapist                  OT Assessment/Plan     Row Name 12/10/18 1710          OT Assessment    Assessment Comments  Pt. positioned supine on mat table for manual lymph drainage, compression pump, skin care and multi layered bandaging BLE.  -BC       User Key  (r) = Recorded By, (t) = Taken By, (c) = Cosigned By    Initials Name Provider Type    BC Eileen Oh, OT Occupational Therapist                                           Time Calculation:   OT Start Time: 1300  OT Stop Time: 1430  OT Time Calculation (min): 90 min  OT Non-Billable Time (min): 10 min     Therapy Suggested Charges     Code   Minutes Charges    None               Therapy Charges for Today     Code Description Service Date Service Provider Modifiers Qty    [id number]  OT LYMPHEDEMA MANAGEMENT-15 MIN 12/10/2018 Adriano  EMMANUEL Arellano  1    [id number] HC OT MANUAL THERAPY EA 15 MIN 12/10/2018 Eileen Oh OT GO 5                      Eileen Oh, EMMANUEL  12/10/2018

## 2018-12-12 ENCOUNTER — HOSPITAL ENCOUNTER (OUTPATIENT)
Dept: OCCUPATIONAL THERAPY | Facility: HOSPITAL | Age: 73
Setting detail: THERAPIES SERIES
Discharge: HOME | End: 2018-12-12

## 2018-12-12 DIAGNOSIS — I89.0 LYMPHEDEMA OF BOTH LOWER EXTREMITIES: Primary | ICD-10-CM

## 2018-12-12 PROCEDURE — 97140 MANUAL THERAPY 1/> REGIONS: CPT

## 2018-12-12 PROCEDURE — 97139 UNLISTED THERAPEUTIC PX: CPT

## 2018-12-12 NOTE — THERAPY TREATMENT NOTE
Outpatient Occupational Therapy Lymphedema Treatment Note   [facility]     Patient Name: Layne Case  : 1945  MRN: [id number]  Today's Date: 2018      Visit Date: 2018    Patient Active Problem List   Diagnosis   • Abnormal mammogram   • Umbilical hernia   • Umbilical hernia without obstruction or gangrene        Past Medical History:   Diagnosis Date   • Abnormal mammogram    • Anxiety    • Arthritis    • Bray esophagus    • Hypertension         Past Surgical History:   Procedure Laterality Date   • ENDOSCOPY AND COLONOSCOPY  2017   • HYSTERECTOMY           Visit Dx:      ICD-10-CM ICD-9-CM   1. Lymphedema of both lower extremities I89.0 457.1       Lymphedema     Row Name 18 1400             Subjective Pain    Able to rate subjective pain?  yes  -BC      Pre-Treatment Pain Level  4  -BC         Lymphedema Assessment    Lymphedema Classification  RLE:;LLE:  -BC         Lymphedema Edema Assessment    Ptting Edema Category  By grade out of 4  -BC      Pitting Edema  + 4/4  -BC         Skin Changes/Observations    Location/Assessment  Lower Extremity  -BC      Lower Extremity Conditions  bilateral:;intact;clean;dry;hairless  -BC      Lower Extremity Color/Pigment  bilateral:;blanchable  -BC         Lymphedema Sensation    Lymphedema Sensation Reports  LLE:;numbness;tingling  -BC      Lymphedema Sensation Tests  light touch;deep touch  -BC      Lymphedema Light Touch  RLE:;LLE:;WNL  -BC      Lymphedema Deep Touch  LUE:;RLE:;WNL  -BC         Lymphedema Measurements    Measurement Type(s)  Quick Girth  -BC      Quick Girth Areas  Lower extremities  -BC         LLE Quick Girth (cm)    Met-heads  24.8 cm  -BC      Mid foot  26.3 cm  -BC      Smallest ankle  23 cm  -BC      Largest calf  45.3 cm  -BC      Tib tuberosity  43.4 cm  -BC      Mid patella  52.1 cm  -BC      Distal thigh  59.8 cm  -BC      Proximal thigh  67.3 cm  -BC      Other 1  30.9 cm  -BC      Other 2  44.8 cm  -BC          RLE Quick Girth (cm)    Met-heads  24.5 cm  -BC      Mid foot  24 cm  -BC      Smallest ankle  22.7 cm  -BC      Largest calf  44 cm  -BC      Tib tuberosity  43.1 cm  -BC      Mid patella  49.5 cm  -BC      Distal thigh  62.4 cm  -BC      Proximal thigh  69 cm  -BC      Other 1  26.5 cm  -BC      Other 2  41 cm  -BC      RLE Quick Girth Total  406.7  -BC         Manual Lymphatic Drainage    Manual Lymphatic Drainage  initial sequence;extremity treatment  -BC      Initial Sequence  abdomen  -BC      Abdomen  superficial  -BC         Compression/Skin Care    Compression/Skin Care  bandaging  -BC      Bandage Layers  soft foam- 1/4 inch;cotton elastic stocking- single layer (comment size);short-stretch bandages (comment size/quantity)  -BC      Bandaging Technique  circumferential/spiral;light compression  -BC      Compression/Skin Care Comments  Compression  pump x 30 min.  -BC        User Key  (r) = Recorded By, (t) = Taken By, (c) = Cosigned By    Initials Name Provider Type    Eileen Nicole OT Occupational Therapist                  OT Assessment/Plan     Row Name 12/12/18 3635          OT Assessment    Assessment Comments  Pt. positioned supine on mat table for manual lymph drainage, compression pump, skin care and multi layered bandaging BLE mid foot to below knee.   -BC       User Key  (r) = Recorded By, (t) = Taken By, (c) = Cosigned By    Initials Name Provider Type    Eileen Nicole OT Occupational Therapist                                           Time Calculation:   OT Start Time: 1430  OT Stop Time: 1600  OT Time Calculation (min): 90 min  OT Non-Billable Time (min): 10 min     Therapy Suggested Charges     Code   Minutes Charges    None               Therapy Charges for Today     Code Description Service Date Service Provider Modifiers Qty    93333428383  OT LYMPHEDEMA MANAGEMENT-15 MIN 12/12/2018 Eileen Oh OT  1    [id number]  OT MANUAL THERAPY EA 15 MIN 12/12/2018 Adriano  Eileen, OT GO 5                      Eileen Oh, OT  12/12/2018

## 2018-12-17 ENCOUNTER — HOSPITAL ENCOUNTER (OUTPATIENT)
Dept: OCCUPATIONAL THERAPY | Facility: HOSPITAL | Age: 73
Setting detail: THERAPIES SERIES
Discharge: HOME | End: 2018-12-17

## 2018-12-17 DIAGNOSIS — I89.0 LYMPHEDEMA OF BOTH LOWER EXTREMITIES: Primary | ICD-10-CM

## 2018-12-17 PROCEDURE — 97139 UNLISTED THERAPEUTIC PX: CPT

## 2018-12-17 PROCEDURE — 97140 MANUAL THERAPY 1/> REGIONS: CPT

## 2018-12-17 NOTE — THERAPY TREATMENT NOTE
Outpatient Occupational Therapy Lymphedema Treatment Note   [facility]     Patient Name: Lyane Csae  : 1945  MRN: [id number]  Today's Date: 2018      Visit Date: 2018    Patient Active Problem List   Diagnosis   • Abnormal mammogram   • Umbilical hernia   • Umbilical hernia without obstruction or gangrene        Past Medical History:   Diagnosis Date   • Abnormal mammogram    • Anxiety    • Arthritis    • Bray esophagus    • Hypertension         Past Surgical History:   Procedure Laterality Date   • ENDOSCOPY AND COLONOSCOPY  2017   • HYSTERECTOMY           Visit Dx:      ICD-10-CM ICD-9-CM   1. Lymphedema of both lower extremities I89.0 457.1       Lymphedema     Row Name 18 1300             Subjective Pain    Able to rate subjective pain?  yes  -BC      Pre-Treatment Pain Level  6  -BC      Subjective Pain Comment  My back and leg are killing me.  -BC         Lymphedema Assessment    Lymphedema Classification  RLE:;LLE:  -BC         Lymphedema Edema Assessment    Ptting Edema Category  By grade out of 4  -BC      Pitting Edema  + 4/4  -BC         Skin Changes/Observations    Location/Assessment  Lower Extremity  -BC      Lower Extremity Conditions  bilateral:;intact;clean;dry;hairless  -BC      Lower Extremity Color/Pigment  bilateral:;blanchable  -BC         Lymphedema Sensation    Lymphedema Sensation Reports  LLE:;numbness;tingling  -BC      Lymphedema Sensation Tests  light touch;deep touch  -BC      Lymphedema Light Touch  RLE:;LLE:;WNL  -BC      Lymphedema Deep Touch  LUE:;RLE:;WNL  -BC         Lymphedema Measurements    Measurement Type(s)  Quick Girth  -BC      Quick Girth Areas  Lower extremities  -BC         LLE Quick Girth (cm)    Met-heads  24.1 cm  -BC      Mid foot  23.3 cm  -BC      Smallest ankle  21.4 cm  -BC      Largest calf  43.2 cm  -BC      Tib tuberosity  42.8 cm  -BC      Mid patella  48 cm  -BC      Distal thigh  59 cm  -BC      Proximal thigh  65.5 cm  -BC       Other 1  30.4 cm  -BC      Other 2  43.4 cm  -BC         RLE Quick Girth (cm)    Met-heads  23.2 cm  -BC      Mid foot  23.1 cm  -BC      Smallest ankle  22.1 cm  -BC      Largest calf  42.4 cm  -BC      Tib tuberosity  40.8 cm  -BC      Mid patella  48 cm  -BC      Distal thigh  59.8 cm  -BC      Proximal thigh  67.6 cm  -BC      Other 1  28 cm  -BC      Other 2  41.8 cm  -BC      RLE Quick Girth Total  396.8  -BC         Manual Lymphatic Drainage    Manual Lymphatic Drainage  initial sequence;extremity treatment  -BC      Initial Sequence  abdomen  -BC      Abdomen  superficial  -BC         Compression/Skin Care    Compression/Skin Care  bandaging  -BC      Bandage Layers  soft foam- 1/4 inch;cotton elastic stocking- single layer (comment size);short-stretch bandages (comment size/quantity)  -BC      Bandaging Technique  circumferential/spiral;light compression  -BC      Compression/Skin Care Comments  Compression pump x 30 min.   -BC        User Key  (r) = Recorded By, (t) = Taken By, (c) = Cosigned By    Initials Name Provider Type    Eileen Nicole OT Occupational Therapist                  OT Assessment/Plan     Row Name 12/17/18 1420          OT Assessment    Assessment Comments  Pt. positioned supine on mat table for manual lymph drainage, compression pump, skin care and multi layered bandaging BLE.   -BC       User Key  (r) = Recorded By, (t) = Taken By, (c) = Cosigned By    Initials Name Provider Type    Eileen Nicole OT Occupational Therapist                                           Time Calculation:   OT Start Time: 1300  OT Stop Time: 1415  OT Time Calculation (min): 75 min  OT Non-Billable Time (min): 10 min     Therapy Suggested Charges     Code   Minutes Charges    None               Therapy Charges for Today     Code Description Service Date Service Provider Modifiers Qty    39493585930  OT LYMPHEDEMA MANAGEMENT-15 MIN 12/17/2018 Eileen Oh OT  1    [id number]  OT MANUAL  THERAPY EA 15 MIN 12/17/2018 Adriano, Eileen, EMMANUEL GO 4                      Eileen Oh, OT  12/17/2018

## 2018-12-19 ENCOUNTER — HOSPITAL ENCOUNTER (OUTPATIENT)
Dept: OCCUPATIONAL THERAPY | Facility: HOSPITAL | Age: 73
Setting detail: THERAPIES SERIES
Discharge: HOME | End: 2018-12-19

## 2018-12-19 DIAGNOSIS — I89.0 LYMPHEDEMA OF BOTH LOWER EXTREMITIES: Primary | ICD-10-CM

## 2018-12-19 PROCEDURE — 97140 MANUAL THERAPY 1/> REGIONS: CPT

## 2018-12-19 PROCEDURE — 97139 UNLISTED THERAPEUTIC PX: CPT

## 2018-12-19 NOTE — THERAPY TREATMENT NOTE
Outpatient Occupational Therapy Lymphedema Treatment Note   [place]     Patient Name: Layne Case  : 1945  MRN: [id number]  Today's Date: 2018      Visit Date: 2018    Patient Active Problem List   Diagnosis   • Abnormal mammogram   • Umbilical hernia   • Umbilical hernia without obstruction or gangrene        Past Medical History:   Diagnosis Date   • Abnormal mammogram    • Anxiety    • Arthritis    • Bray esophagus    • Hypertension         Past Surgical History:   Procedure Laterality Date   • ENDOSCOPY AND COLONOSCOPY  2017   • HYSTERECTOMY     • VENTRAL/INCISIONAL HERNIA REPAIR N/A 2017    Procedure: VENTRAL/INCISIONAL HERNIA REPAIR LAPAROSCOPIC WITH PATCH;  Surgeon: Jf Gorman MD;  Location: [facility];  Service:          Visit Dx:      ICD-10-CM ICD-9-CM   1. Lymphedema of both lower extremities I89.0 457.1       Lymphedema     Row Name 18 1300             Subjective Pain    Able to rate subjective pain?  yes  -BC      Pre-Treatment Pain Level  5  -BC      Subjective Pain Comment  My hip and back hurt.  -BC         Lymphedema Assessment    Lymphedema Classification  RLE:;LLE:  -BC         Lymphedema Edema Assessment    Ptting Edema Category  By grade out of 4  -BC      Pitting Edema  + 4/4  -BC         Skin Changes/Observations    Location/Assessment  Lower Extremity  -BC      Lower Extremity Conditions  bilateral:;intact;clean;dry;hairless  -BC      Lower Extremity Color/Pigment  bilateral:;blanchable  -BC         Lymphedema Sensation    Lymphedema Sensation Reports  LLE:;numbness;tingling  -BC      Lymphedema Sensation Tests  light touch;deep touch  -BC      Lymphedema Light Touch  RLE:;LLE:;WNL  -BC      Lymphedema Deep Touch  LUE:;RLE:;WNL  -BC         Lymphedema Measurements    Measurement Type(s)  Quick Girth  -BC      Quick Girth Areas  Lower extremities  -BC         LLE Quick Girth (cm)    Met-heads  25 cm  -BC      Mid foot  25 cm  -BC      Smallest  ankle  22.5 cm  -BC      Largest calf  43.2 cm  -BC      Tib tuberosity  43.4 cm  -BC      Mid patella  51.2 cm  -BC      Distal thigh  57.8 cm  -BC      Proximal thigh  66.8 cm  -BC      Other 1  30.8 cm  -BC      Other 2  43.2 cm  -BC         RLE Quick Girth (cm)    Met-heads  24.3 cm  -BC      Mid foot  23.8 cm  -BC      Smallest ankle  22.5 cm  -BC      Largest calf  43.4 cm  -BC      Tib tuberosity  41.5 cm  -BC      Mid patella  49.4 cm  -BC      Distal thigh  60.6 cm  -BC      Proximal thigh  68.2 cm  -BC      Other 1  28.9 cm  -BC      Other 2  41.3 cm  -BC      RLE Quick Girth Total  403.9  -BC         Manual Lymphatic Drainage    Manual Lymphatic Drainage  initial sequence;extremity treatment  -BC      Initial Sequence  abdomen  -BC      Abdomen  superficial  -BC         Compression/Skin Care    Compression/Skin Care  bandaging  -BC      Bandage Layers  soft foam- 1/4 inch;cotton elastic stocking- single layer (comment size);short-stretch bandages (comment size/quantity)  -BC      Bandaging Technique  circumferential/spiral;light compression  -BC      Compression/Skin Care Comments  Compression pump x 30 min. BLE  -BC        User Key  (r) = Recorded By, (t) = Taken By, (c) = Cosigned By    Initials Name Provider Type    Eileen Nicole OT Occupational Therapist                  OT Assessment/Plan     Row Name 12/19/18 1431          OT Assessment    Assessment Comments  Pt. positioned supine on  mat table for manual lymph drainage, compression pump, skin care and multi layered bandaging of BLE.  -BC       User Key  (r) = Recorded By, (t) = Taken By, (c) = Cosigned By    Initials Name Provider Type    Eileen Nicole OT Occupational Therapist                                           Time Calculation:   OT Start Time: 1315  OT Stop Time: 1433  OT Time Calculation (min): 78 min  OT Non-Billable Time (min): 10 min     Therapy Suggested Charges     Code   Minutes Charges    None               Therapy  Charges for Today     Code Description Service Date Service Provider Modifiers Qty    47918498072 HC OT LYMPHEDEMA MANAGEMENT-15 MIN 12/19/2018 Eileen Oh OT  1    [id number] HC OT MANUAL THERAPY EA 15 MIN 12/19/2018 Eileen Oh OT GO 4                      Eileen Oh, OT  12/19/2018

## 2018-12-26 ENCOUNTER — HOSPITAL ENCOUNTER (OUTPATIENT)
Dept: OCCUPATIONAL THERAPY | Facility: HOSPITAL | Age: 73
Setting detail: THERAPIES SERIES
Discharge: HOME | End: 2018-12-26

## 2018-12-26 DIAGNOSIS — I89.0 LYMPHEDEMA OF BOTH LOWER EXTREMITIES: Primary | ICD-10-CM

## 2018-12-26 PROCEDURE — 97140 MANUAL THERAPY 1/> REGIONS: CPT

## 2018-12-26 PROCEDURE — 97139 UNLISTED THERAPEUTIC PX: CPT

## 2018-12-26 NOTE — THERAPY TREATMENT NOTE
Outpatient Occupational Therapy Lymphedema Treatment Note   [place]     Patient Name: Layne Case  : 1945  MRN: [id number]  Today's Date: 2018      Visit Date: 2018    Patient Active Problem List   Diagnosis   • Abnormal mammogram   • Umbilical hernia   • Umbilical hernia without obstruction or gangrene        Past Medical History:   Diagnosis Date   • Abnormal mammogram    • Anxiety    • Arthritis    • Bray esophagus    • Hypertension         Past Surgical History:   Procedure Laterality Date   • ENDOSCOPY AND COLONOSCOPY  2017   • HYSTERECTOMY     • VENTRAL/INCISIONAL HERNIA REPAIR N/A 2017    Procedure: VENTRAL/INCISIONAL HERNIA REPAIR LAPAROSCOPIC WITH PATCH;  Surgeon: Jf Gorman MD;  Location: [facility];  Service:          Visit Dx:      ICD-10-CM ICD-9-CM   1. Lymphedema of both lower extremities I89.0 457.1       Lymphedema     Row Name 18 1300             Subjective Pain    Able to rate subjective pain?  yes  -BC      Pre-Treatment Pain Level  4  -BC      Subjective Pain Comment  L hip and leg.  -BC         Lymphedema Assessment    Lymphedema Classification  RLE:;LLE:  -BC         Lymphedema Edema Assessment    Ptting Edema Category  By grade out of 4  -BC      Pitting Edema  + 4/4  -BC         Skin Changes/Observations    Location/Assessment  Lower Extremity  -BC      Lower Extremity Conditions  bilateral:;intact;clean;dry;hairless  -BC      Lower Extremity Color/Pigment  bilateral:;blanchable  -BC         Lymphedema Sensation    Lymphedema Sensation Reports  LLE:;numbness;tingling  -BC      Lymphedema Sensation Tests  light touch;deep touch  -BC      Lymphedema Light Touch  RLE:;LLE:;WNL  -BC      Lymphedema Deep Touch  LUE:;RLE:;WNL  -BC         Lymphedema Measurements    Measurement Type(s)  Quick Girth  -BC      Quick Girth Areas  Lower extremities  -BC         LLE Quick Girth (cm)    Met-heads  25.5 cm  -BC      Mid foot  26.4 cm  -BC      Smallest ankle   24.6 cm  -BC      Largest calf  46 cm  -BC      Tib tuberosity  44 cm  -BC      Mid patella  52.1 cm  -BC      Distal thigh  61.4 cm  -BC      Proximal thigh  66.1 cm  -BC      Other 1  33 cm  -BC      Other 2  44.8 cm  -BC         RLE Quick Girth (cm)    Met-heads  25.2 cm  -BC      Mid foot  25.5 cm  -BC      Smallest ankle  25.8 cm  -BC      Largest calf  46.4 cm  -BC      Tib tuberosity  43.3 cm  -BC      Mid patella  51.8 cm  -BC      Distal thigh  64.3 cm  -BC      Proximal thigh  68.5 cm  -BC      Other 1  32.2 cm  -BC      Other 2  44.6 cm  -BC      RLE Quick Girth Total  427.6  -BC         Manual Lymphatic Drainage    Manual Lymphatic Drainage  initial sequence;extremity treatment  -BC      Initial Sequence  abdomen  -BC      Abdomen  superficial  -BC         Compression/Skin Care    Compression/Skin Care  bandaging  -BC      Bandage Layers  soft foam- 1/4 inch;cotton elastic stocking- single layer (comment size);short-stretch bandages (comment size/quantity)  -BC      Bandaging Technique  circumferential/spiral;light compression  -BC      Compression/Skin Care Comments  Compression pump x 30 min.   -BC        User Key  (r) = Recorded By, (t) = Taken By, (c) = Cosigned By    Initials Name Provider Type    Eileen Nicole OT Occupational Therapist                  OT Assessment/Plan     Row Name 12/26/18 1430          OT Assessment    Assessment Comments  Pt. positioned supine on mat table for manual lymph drainage, compression pump, skin care and multi layered bandaging of BLE.  Pt. with increased girth this date secondary to NOT wearing bandages for the past 2-3 days.   -BC       User Key  (r) = Recorded By, (t) = Taken By, (c) = Cosigned By    Initials Name Provider Type    Eileen Nicole OT Occupational Therapist                                           Time Calculation:   OT Start Time: 1250  OT Stop Time: 1424  OT Time Calculation (min): 94 min  OT Non-Billable Time (min): 10 min     Therapy  Suggested Charges     Code   Minutes Charges    None               Therapy Charges for Today     Code Description Service Date Service Provider Modifiers Qty    45689988719 HC OT LYMPHEDEMA MANAGEMENT-15 MIN 12/26/2018 Eileen Oh OT  1    [id number] HC OT MANUAL THERAPY EA 15 MIN 12/26/2018 Eileen Oh OT GO 5                      Eileen Oh, OT  12/26/2018

## 2019-01-02 ENCOUNTER — HOSPITAL ENCOUNTER (OUTPATIENT)
Dept: OCCUPATIONAL THERAPY | Facility: HOSPITAL | Age: 74
Setting detail: THERAPIES SERIES
Discharge: HOME | End: 2019-01-02

## 2019-01-02 ENCOUNTER — TRANSCRIBE ORDERS (OUTPATIENT)
Dept: PHYSICAL THERAPY | Facility: HOSPITAL | Age: 74
End: 2019-01-02

## 2019-01-02 DIAGNOSIS — I89.0 LYMPHEDEMA OF BOTH LOWER EXTREMITIES: Primary | ICD-10-CM

## 2019-01-02 DIAGNOSIS — R42 VERTIGO: Primary | ICD-10-CM

## 2019-01-02 PROCEDURE — 97139 UNLISTED THERAPEUTIC PX: CPT

## 2019-01-02 PROCEDURE — 97140 MANUAL THERAPY 1/> REGIONS: CPT

## 2019-01-02 NOTE — THERAPY TREATMENT NOTE
Outpatient Occupational Therapy Lymphedema Treatment Note   [place]     Patient Name: Layne Case  : 1945  MRN: [id number]  Today's Date: 2019      Visit Date: 2019    Patient Active Problem List   Diagnosis   • Abnormal mammogram   • Umbilical hernia   • Umbilical hernia without obstruction or gangrene        Past Medical History:   Diagnosis Date   • Abnormal mammogram    • Anxiety    • Arthritis    • Bray esophagus    • Hypertension         Past Surgical History:   Procedure Laterality Date   • ENDOSCOPY AND COLONOSCOPY  2017   • HYSTERECTOMY     • VENTRAL/INCISIONAL HERNIA REPAIR N/A 2017    Procedure: VENTRAL/INCISIONAL HERNIA REPAIR LAPAROSCOPIC WITH PATCH;  Surgeon: Jf Gorman MD;  Location: [facility];  Service:          Visit Dx:      ICD-10-CM ICD-9-CM   1. Lymphedema of both lower extremities I89.0 457.1       Lymphedema     Row Name 19 1300             Subjective Pain    Able to rate subjective pain?  yes  -BC      Pre-Treatment Pain Level  4  -BC      Subjective Pain Comment  L hip and leg.  -BC         Lymphedema Assessment    Lymphedema Classification  RLE:;LLE:  -BC         Lymphedema Edema Assessment    Ptting Edema Category  By grade out of 4  -BC      Pitting Edema  + 4/4  -BC         Skin Changes/Observations    Location/Assessment  Lower Extremity  -BC      Lower Extremity Conditions  bilateral:;intact;clean;dry;hairless  -BC      Lower Extremity Color/Pigment  bilateral:;blanchable  -BC         Lymphedema Sensation    Lymphedema Sensation Reports  LLE:;numbness;tingling  -BC      Lymphedema Sensation Tests  light touch;deep touch  -BC      Lymphedema Light Touch  RLE:;LLE:;WNL  -BC      Lymphedema Deep Touch  LUE:;RLE:;WNL  -BC         Lymphedema Measurements    Measurement Type(s)  Quick Girth  -BC      Quick Girth Areas  Lower extremities  -BC         LLE Quick Girth (cm)    Met-heads  24.9 cm  -BC      Mid foot  25.5 cm  -BC      Smallest ankle   23.3 cm  -BC      Largest calf  45 cm  -BC      Tib tuberosity  43.3 cm  -BC      Mid patella  52 cm  -BC      Distal thigh  61 cm  -BC      Proximal thigh  65 cm  -BC      Other 1  30.5 cm  -BC      Other 2  43 cm  -BC         RLE Quick Girth (cm)    Met-heads  24.8 cm  -BC      Mid foot  25 cm  -BC      Smallest ankle  23.4 cm  -BC      Largest calf  43.8 cm  -BC      Tib tuberosity  41.3 cm  -BC      Mid patella  49.5 cm  -BC      Distal thigh  60 cm  -BC      Proximal thigh  67.7 cm  -BC      Other 1  31.2 cm  -BC      Other 2  42.2 cm  -BC      RLE Quick Girth Total  408.9  -BC         Manual Lymphatic Drainage    Manual Lymphatic Drainage  initial sequence;extremity treatment  -BC      Initial Sequence  abdomen  -BC      Abdomen  superficial  -BC         Compression/Skin Care    Compression/Skin Care  bandaging  -BC      Bandage Layers  soft foam- 1/4 inch;cotton elastic stocking- single layer (comment size);short-stretch bandages (comment size/quantity)  -BC      Bandaging Technique  circumferential/spiral;light compression  -BC      Compression/Skin Care Comments  Compression pump x 30 min.   -BC        User Key  (r) = Recorded By, (t) = Taken By, (c) = Cosigned By    Initials Name Provider Type    Eileen Nicole, OT Occupational Therapist                  OT Assessment/Plan     Row Name 01/02/19 1451          OT Assessment    Assessment Comments  Pt. positioned supine on mat table for manual lymph drainage, compression pump, skin care and donning/doffing of BLE garments.  Pt. presents with new compression stockings on BLE with noticable indentations at L ankle in 3 areas, R ankle at 2 seperate areas, pt. advised to keep stockings from settling into creases.    -BC       User Key  (r) = Recorded By, (t) = Taken By, (c) = Cosigned By    Initials Name Provider Type    BC Eileen Oh, OT Occupational Therapist                                           Time Calculation:   OT Start Time: 1300  OT Stop  Time: 1430  OT Time Calculation (min): 90 min  OT Non-Billable Time (min): 10 min     Therapy Suggested Charges     Code   Minutes Charges    None               Therapy Charges for Today     Code Description Service Date Service Provider Modifiers Qty    82534882419 HC OT LYMPHEDEMA MANAGEMENT-15 MIN 1/2/2019 Eileen Oh OT  1    [id number] HC OT MANUAL THERAPY EA 15 MIN 1/2/2019 Eileen Oh OT GO 5                      Eileen Oh OT  1/2/2019

## 2019-01-09 ENCOUNTER — HOSPITAL ENCOUNTER (OUTPATIENT)
Dept: PHYSICAL THERAPY | Facility: HOSPITAL | Age: 74
Setting detail: THERAPIES SERIES
Discharge: HOME | End: 2019-01-09

## 2019-01-09 ENCOUNTER — HOSPITAL ENCOUNTER (OUTPATIENT)
Dept: OCCUPATIONAL THERAPY | Facility: HOSPITAL | Age: 74
Setting detail: THERAPIES SERIES
Discharge: HOME | End: 2019-01-09

## 2019-01-09 DIAGNOSIS — I89.0 LYMPHEDEMA OF BOTH LOWER EXTREMITIES: Primary | ICD-10-CM

## 2019-01-09 DIAGNOSIS — R42 VERTIGO: Primary | ICD-10-CM

## 2019-01-09 PROCEDURE — 97139 UNLISTED THERAPEUTIC PX: CPT

## 2019-01-09 PROCEDURE — 97163 PT EVAL HIGH COMPLEX 45 MIN: CPT

## 2019-01-09 PROCEDURE — 97140 MANUAL THERAPY 1/> REGIONS: CPT

## 2019-01-09 NOTE — THERAPY EVALUATION
Outpatient Physical Therapy Vestibular Initial Evaluation   [place]     Patient Name: Layne Case  : 1945  MRN: [id number]  Today's Date: 2019      Visit Date: 2019    Patient Active Problem List   Diagnosis   • Abnormal mammogram   • Umbilical hernia   • Umbilical hernia without obstruction or gangrene        Past Medical History:   Diagnosis Date   • Abnormal mammogram    • Anxiety    • Arthritis    • Bray esophagus    • Hypertension         Past Surgical History:   Procedure Laterality Date   • ENDOSCOPY AND COLONOSCOPY  2017   • HYSTERECTOMY     • VENTRAL/INCISIONAL HERNIA REPAIR N/A 2017    Procedure: VENTRAL/INCISIONAL HERNIA REPAIR LAPAROSCOPIC WITH PATCH;  Surgeon: Jf Gorman MD;  Location: [facility];  Service:          Visit Dx:     ICD-10-CM ICD-9-CM   1. Vertigo R42 780.4       Patient History     Row Name 19 1300             History    Chief Complaint  Dizziness;Balance Problems;Difficulty with daily activities  -RT      Date Current Problem(s) Began  18  -RT      Brief Description of Current Complaint  Pt reports around three months ago she began to have increased dizziness and impaired balance.  Pt received treatment for BPPV of the right side with good results.  Pt reports she has been having increased left sided dizziness for the past 2 weeks.  The patient recently was evaluated by MD Hull and was advised to see therapy for treatment of current vertigo.  -RT      Patient/Caregiver Goals  Relieve pain;Relief from dizziness  -RT      Current Tobacco Use  Non-smoker  -RT      Smoking Status  Non-smoker  -RT      Patient's Rating of General Health  Fair  -RT      Hand Dominance  right-handed  -RT      Occupation/sports/leisure activities  Retired  -RT         Pain     Pain Location  Back  -RT      Pain at Present  3  -RT      Pain at Best  3  -RT      Pain at Worst  8  -RT      Pain Frequency  Constant/continuous  -RT      Pain Description   Aching;Dull  -RT         Fall Risk Assessment    Any falls in the past year:  No  -RT      Number of falls reported in the last 12 months  0  -RT         Daily Activities    Primary Language  English  -RT      Are you able to read  Yes  -RT      Are you able to write  Yes  -RT      How does patient learn best?  Listening;Reading;Demonstration  -RT         Safety    Are you being hurt, hit, or frightened by anyone at home or in your life?  No  -RT      Are you being neglected by a caregiver  No  -RT        User Key  (r) = Recorded By, (t) = Taken By, (c) = Cosigned By    Initials Name Provider Type    RT Keon Walker, PT Physical Therapist          Vestibular Eval     Row Name 01/09/19 1300             Positional Testing    Vertebrobasilar Artery Screen - Right  Negative  -RT      Vertebrobasilar Artery Screen - Left  Negative  -RT      Haslet-Hallpike Right  Upbeat Nystagmus  -RT      Haslet-Hallpike Left  No nystagmus  -RT        User Key  (r) = Recorded By, (t) = Taken By, (c) = Cosigned By    Initials Name Provider Type    RT Keon Walker, PT Physical Therapist        PT Ortho     Row Name 01/09/19 1300       Posture/Observations    Posture/Observations Comments  slumped posture  -RT       Sensory Screen for Light Touch- Upper Quarter Clearing    C4 (posterior shoulder)  Bilateral:;Intact  -RT    C5 (lateral upper arm)  Bilateral:;Intact  -RT    C6 (tip of thumb)  Bilateral:;Intact  -RT    C7 (tip of 3rd finger)  Bilateral:;Intact  -RT    C8 (tip of 5th finger)  Bilateral:;Intact  -RT    T1 (medial lower arm)  Bilateral:;Intact  -RT       Myotomal Screen- Upper Quarter Clearing    Shoulder flexion (C5)  Bilateral:;4+ (Good +)  -RT    Elbow flexion/wrist extension (C6)  Bilateral:;4+ (Good +)  -RT    Elbow extension/wrist flexion (C7)  Bilateral:;4+ (Good +)  -RT       Cervical/Shoulder ROM Screen    Cervical flexion  Normal  -RT    Cervical extension  Normal  -RT    Cervical lateral flexion  Normal  -RT     Cervical rotation  Normal  -RT       Sensory Screen for Light Touch- Lower Quarter Clearing    L1 (inguinal area)  Bilateral:;Intact  -RT    L2 (anterior mid thigh)  Bilateral:;Intact  -RT    L3 (distal anterior thigh)  Bilateral:;Intact  -RT    L4 (medial lower leg/foot)  Bilateral:;Intact  -RT    L5 (lateral lower leg/great toe)  Bilateral:;Intact  -RT    S1 (bottom of foot)  Intact;Bilateral:  -RT       Myotomal Screen- Lower Quarter Clearing    Hip flexion (L2)  Bilateral:;4+ (Good +)  -RT    Knee extension (L3)  Bilateral:;4+ (Good +)  -RT    Knee flexion (S2)  Bilateral:;4+ (Good +)  -RT      User Key  (r) = Recorded By, (t) = Taken By, (c) = Cosigned By    Initials Name Provider Type    RT Keon Walker, PT Physical Therapist          PT Neuro     Row Name 01/09/19 1300             Coordination    Coordination Tests  Rapid Alternating;Finger to nose eyes open;Finger to nose eyes closed  -RT      Rapid Alternating  Bilteral:;Intact  -RT      Finger to Nose Eyes Open  Bilteral:;Intact  -RT      Finger to Nose Eyes Closed  Bilteral:;Intact  -RT        User Key  (r) = Recorded By, (t) = Taken By, (c) = Cosigned By    Initials Name Provider Type    RT Keon Walker, PT Physical Therapist          Lymphedema     Row Name 01/09/19 1400             Lymphedema Assessment    Lymphedema Classification  RLE:;LLE:  (Pended)   -BC         Lymphedema Edema Assessment    Ptting Edema Category  By grade out of 4  (Pended)   -BC      Pitting Edema  + 4/4  (Pended)   -BC         Skin Changes/Observations    Location/Assessment  Lower Extremity  (Pended)   -BC      Lower Extremity Conditions  bilateral:;intact;clean;dry;hairless  (Pended)   -BC      Lower Extremity Color/Pigment  bilateral:;blanchable  (Pended)   -BC         Lymphedema Sensation    Lymphedema Sensation Reports  LLE:;numbness;tingling  (Pended)   -BC      Lymphedema Sensation Tests  light touch;deep touch  (Pended)   -BC      Lymphedema Light Touch   RLE:;LLE:;WNL  (Pended)   -BC      Lymphedema Deep Touch  LUE:;RLE:;WNL  (Pended)   -BC         Lymphedema Measurements    Measurement Type(s)  Quick Girth  (Pended)   -BC      Quick Girth Areas  Lower extremities  (Pended)   -BC         Manual Lymphatic Drainage    Manual Lymphatic Drainage  initial sequence;extremity treatment  (Pended)   -BC      Initial Sequence  abdomen  (Pended)   -BC      Abdomen  superficial  (Pended)   -BC         Compression/Skin Care    Compression/Skin Care  bandaging  (Pended)   -BC      Bandage Layers  soft foam- 1/4 inch;cotton elastic stocking- single layer (comment size);short-stretch bandages (comment size/quantity)  (Pended)   -BC      Bandaging Technique  circumferential/spiral;light compression  (Pended)   -BC        User Key  (r) = Recorded By, (t) = Taken By, (c) = Cosigned By    Initials Name Provider Type    Eileen Nicole OT Occupational Therapist            Therapy Education  Given: Symptoms/condition management, Fall prevention and home safety, Mobility training, Posture/body mechanics  Program: Reinforced  How Provided: Verbal, Demonstration  Provided to: Patient  Level of Understanding: Verbalized, Demonstrated                      PT OP Goals     Row Name 01/09/19 1400          PT Short Term Goals    STG Date to Achieve  01/23/19  -RT     STG 1  Pt will report a 50% decrease in dizziness.  -RT     STG 1 Progress  New  -RT     STG 2  Pt will be instructed in a self application of Eppley.  -RT     STG 2 Progress  New  -RT        Time Calculation    PT Goal Re-Cert Due Date  02/06/19  -RT       User Key  (r) = Recorded By, (t) = Taken By, (c) = Cosigned By    Initials Name Provider Type    RT Keon Walker, PT Physical Therapist          PT Assessment/Plan     Row Name 01/09/19 1440          PT Assessment    Functional Limitations  Decreased safety during functional activities;Impaired gait;Impaired locomotion;Limitation in home management;Limitations in community  activities;Performance in leisure activities;Performance in self-care ADL  -RT     Impairments  Balance;Endurance;Gait;Muscle strength;Pain;Poor body mechanics;Posture  -RT     Assessment Comments  Pt is a 74 y/o female referred to therapy for treatment of vertigo.  Nguyễn Hallpike test performed on left and right and pt presented with a positive test(up beating nystagmus) with right side testing.  Eppley maneuver performed on right twice and pt was instructed to return to therapy in two days.  -RT     Please refer to paper survey for additional self-reported information  Yes  -RT     Rehab Potential  Good  -RT     Patient/caregiver participated in establishment of treatment plan and goals  Yes  -RT     Patient would benefit from skilled therapy intervention  Yes  -RT        PT Plan    PT Frequency  2x/week  -RT     Predicted Duration of Therapy Intervention (Therapy Eval)  2 weeks  -RT     Planned CPT's?  PT EVAL HIGH COMPLEXITY: 97854;PT RE-EVAL: 42053;PT THER PROC EA 15 MIN: 83804;PT THER ACT EA 15 MIN: 77714;PT MANUAL THERAPY EA 15 MIN: 45675;PT NEUROMUSC RE-EDUCATION EA 15 MIN: 38077;PT GAIT TRAINING EA 15 MIN: 21341;PT HOT OR COLD PACK TREAT MCARE;PT ELECTRICAL STIM UNATTEND: ;PT ULTRASOUND EA 15 MIN: 56688  -RT     Physical Therapy Interventions (Optional Details)  balance training;bed mobility training;gait training;home exercise program;lumbar stabilization;joint mobilization;manual therapy techniques;modalities;neuromuscular re-education;patient/family education;postural re-education;ROM (Range of Motion);strengthening;stretching  -RT     PT Plan Comments  Will follow for optimal gains.  -RT       User Key  (r) = Recorded By, (t) = Taken By, (c) = Cosigned By    Initials Name Provider Type    RT Keon Walker PT Physical Therapist                     Time Calculation:   Start Time: 1330  Stop Time: 1430  Time Calculation (min): 60 min   Therapy Suggested Charges     Code   Minutes Charges    None            Therapy Charges for Today     Code Description Service Date Service Provider Modifiers Qty    87381146154 HC PT CHNG MAIN POS CURRENT 1/9/2019 Keon Walker, PT  1    43448457666 HC PT NG MAIN POS PROJECTED 1/9/2019 Keon Walker, PT  1    77892796193 HC PT EVAL HIGH COMPLEXITY 4 1/9/2019 Keon Walker, PT GP 1          PT G-Codes  PT Professional Judgement Used?: Yes  Functional Limitation: Changing and maintaining body position  Changing and Maintaining Body Position Current Status (): At least 20 percent but less than 40 percent impaired, limited or restricted  Changing and Maintaining Body Position Goal Status (): At least 1 percent but less than 20 percent impaired, limited or restricted         Keon Walker, PT  1/9/2019

## 2019-01-09 NOTE — THERAPY DISCHARGE NOTE
Outpatient Occupational Therapy Lymphedema Treatment Note/Discharge Summary   [place]     Patient Name: Layne Case  : 1945  MRN: [id number]  Today's Date: 2019      Visit Date: 2019    Patient Active Problem List   Diagnosis   • Abnormal mammogram   • Umbilical hernia   • Umbilical hernia without obstruction or gangrene        Past Medical History:   Diagnosis Date   • Abnormal mammogram    • Anxiety    • Arthritis    • Bray esophagus    • Hypertension         Past Surgical History:   Procedure Laterality Date   • ENDOSCOPY AND COLONOSCOPY  2017   • HYSTERECTOMY     • VENTRAL/INCISIONAL HERNIA REPAIR N/A 2017    Procedure: VENTRAL/INCISIONAL HERNIA REPAIR LAPAROSCOPIC WITH PATCH;  Surgeon: Jf Gorman MD;  Location: [facility];  Service:          Visit Dx:      ICD-10-CM ICD-9-CM   1. Lymphedema of both lower extremities I89.0 457.1       Lymphedema     Row Name 19 1400             Subjective Pain    Able to rate subjective pain?  yes  -BC      Pre-Treatment Pain Level  4  -BC      Subjective Pain Comment  my back still hurts.  -BC         Lymphedema Assessment    Lymphedema Classification  RLE:;LLE:  -BC         Lymphedema Edema Assessment    Ptting Edema Category  By grade out of 4  -BC      Pitting Edema  + 4/4  -BC         Skin Changes/Observations    Location/Assessment  Lower Extremity  -BC      Lower Extremity Conditions  bilateral:;intact;clean;dry;hairless  -BC      Lower Extremity Color/Pigment  bilateral:;blanchable  -BC         Lymphedema Sensation    Lymphedema Sensation Reports  LLE:;numbness;tingling  -BC      Lymphedema Sensation Tests  light touch;deep touch  -BC      Lymphedema Light Touch  RLE:;LLE:;WNL  -BC      Lymphedema Deep Touch  LUE:;RLE:;WNL  -BC         Lymphedema Measurements    Measurement Type(s)  Quick Girth  -BC      Quick Girth Areas  Lower extremities  -BC         LLE Quick Girth (cm)    Met-heads  24 cm  -BC      Mid foot  24.9 cm  -BC       Smallest ankle  22.5 cm  -BC      Largest calf  44 cm  -BC      Tib tuberosity  42.8 cm  -BC      Mid patella  52.1 cm  -BC      Distal thigh  60.3 cm  -BC      Proximal thigh  65.5 cm  -BC      Other 1  30.3 cm  -BC      Other 2  42.5 cm  -BC         RLE Quick Girth (cm)    Met-heads  23.8 cm  -BC      Mid foot  24.2 cm  -BC      Smallest ankle  22.8 cm  -BC      Largest calf  42.9 cm  -BC      Tib tuberosity  41 cm  -BC      Mid patella  49.1 cm  -BC      Distal thigh  60.5 cm  -BC      Proximal thigh  66.7 cm  -BC      Other 1  28 cm  -BC      Other 2  42 cm  -BC      RLE Quick Girth Total  401  -BC         Manual Lymphatic Drainage    Manual Lymphatic Drainage  initial sequence;extremity treatment  -BC      Initial Sequence  abdomen  -BC      Abdomen  superficial  -BC         Compression/Skin Care    Compression/Skin Care  bandaging  -BC      Bandage Layers  soft foam- 1/4 inch;cotton elastic stocking- single layer (comment size);short-stretch bandages (comment size/quantity)  -BC      Bandaging Technique  circumferential/spiral;light compression  -BC      Compression/Skin Care Comments  Compression pump x 30   -BC        User Key  (r) = Recorded By, (t) = Taken By, (c) = Cosigned By    Initials Name Provider Type    BC Eileen Oh OT Occupational Therapist                  OT Assessment/Plan     Row Name 01/09/19 1548 01/09/19 1440       OT Assessment    Assessment Comments  Pt. positioned supine on mat table for manual lymph drainage, compression pump, donning/doffing of BLE garments.  Pt. compliant with d/c instructions.   -BC  --       OT Plan    Predicted Duration of Therapy Intervention (Therapy Eval)  --  2 weeks  -RT      User Key  (r) = Recorded By, (t) = Taken By, (c) = Cosigned By    Initials Name Provider Type    RT Keon Walker, PT Physical Therapist    BC Eileen Oh OT Occupational Therapist                      OT Goals     Row Name 01/09/19 1500          OT Short Term Goals     STG 1 Progress  Met  -BC     STG 2 Progress  Met  -BC     STG 3 Progress  Met  -BC     STG 4 Progress  Met  -BC        Long Term Goals    LTG 1 Progress  Met  -BC     LTG 2 Progress  Met  -BC     LTG 3 Progress  Met  -BC     LTG 4 Progress  Ongoing;Met  -BC       User Key  (r) = Recorded By, (t) = Taken By, (c) = Cosigned By    Initials Name Provider Type    BC Eileen Oh OT Occupational Therapist          Therapy Education  Given: HEP, Symptoms/condition management  Program: Reinforced  How Provided: Verbal, Demonstration  Provided to: Patient  Level of Understanding: Verbalized    How much help from another is currently needed...  Putting on and taking off regular lower body clothing?: none  Bathing (including washing, rinsing, and drying): none  Toileting (which includes using toilet bed pan or urinal): none  Putting on and taking off regular upper body clothing: none  Taking care of personal grooming (such as brushing teeth): none  Eating meals: none  Score: 24           Time Calculation:   OT Start Time: 1430  OT Stop Time: 1530  OT Time Calculation (min): 60 min  OT Non-Billable Time (min): 10 min     Therapy Suggested Charges     Code   Minutes Charges    None           Therapy Charges for Today     Code Description Service Date Service Provider Modifiers Qty    68999767981 HC OT SELFCARE CURRENT 1/9/2019 Eileen Oh OT  1    79116798280 HC OT SELFCARE PROJECTED 1/9/2019 Eileen Oh OT  1    73483965063 HC OT SELFCARE DISCHARGE 1/9/2019 Eileen Oh OT  1    70571634594 HC OT LYMPHEDEMA MANAGEMENT-15 MIN 1/9/2019 Eileen Oh OT KX 1    32885364281 HC OT MANUAL THERAPY EA 15 MIN 1/9/2019 Eileen Oh OT GO, KX 3            OT G-codes  OT Professional Judgement Used?: Yes  OT Functional Scales Options: AM-PAC 6 Clicks Daily Activity (OT)  Score: 22 initial/24 discharge  Functional Limitation: Self care  Self Care Current Status (): At least 20 percent but less than 40 percent  impaired, limited or restricted  Self Care Goal Status (): At least 1 percent but less than 20 percent impaired, limited or restricted  Self Care Discharge Status (): At least 1 percent but less than 20 percent impaired, limited or restricted     OP OT Discharge Summary  Date of Discharge: 01/09/19  Reason for Discharge: All goals achieved  Outcomes Achieved: Able to achieve all goals within established timeline  Discharge Destination: Home with home program  Discharge Instructions: Pt. instructed in HEP for management of symptoms.      Eileen Oh, OT  1/9/2019

## 2019-01-11 ENCOUNTER — HOSPITAL ENCOUNTER (OUTPATIENT)
Dept: PHYSICAL THERAPY | Facility: HOSPITAL | Age: 74
Setting detail: THERAPIES SERIES
Discharge: HOME | End: 2019-01-11

## 2019-01-11 DIAGNOSIS — R42 VERTIGO: Primary | ICD-10-CM

## 2019-01-11 DIAGNOSIS — R26.89 BALANCE PROBLEMS: ICD-10-CM

## 2019-01-11 PROCEDURE — 97110 THERAPEUTIC EXERCISES: CPT

## 2019-01-11 NOTE — THERAPY TREATMENT NOTE
Outpatient Physical Therapy Vestibular Treatment Note   [place]     Patient Name: Layne Case  : 1945  MRN: [id number]  Today's Date: 2019      Visit Date: 2019    Visit Dx:     ICD-10-CM ICD-9-CM   1. Vertigo R42 780.4   2. Balance problems R26.89 781.99       Patient Active Problem List   Diagnosis   • Abnormal mammogram   • Umbilical hernia   • Umbilical hernia without obstruction or gangrene         PT Ortho     Row Name 19 1300       Posture/Observations    Posture/Observations Comments  slumped posture  -RT       Sensory Screen for Light Touch- Upper Quarter Clearing    C4 (posterior shoulder)  Bilateral:;Intact  -RT    C5 (lateral upper arm)  Bilateral:;Intact  -RT    C6 (tip of thumb)  Bilateral:;Intact  -RT    C7 (tip of 3rd finger)  Bilateral:;Intact  -RT    C8 (tip of 5th finger)  Bilateral:;Intact  -RT    T1 (medial lower arm)  Bilateral:;Intact  -RT       Myotomal Screen- Upper Quarter Clearing    Shoulder flexion (C5)  Bilateral:;4+ (Good +)  -RT    Elbow flexion/wrist extension (C6)  Bilateral:;4+ (Good +)  -RT    Elbow extension/wrist flexion (C7)  Bilateral:;4+ (Good +)  -RT       Cervical/Shoulder ROM Screen    Cervical flexion  Normal  -RT    Cervical extension  Normal  -RT    Cervical lateral flexion  Normal  -RT    Cervical rotation  Normal  -RT       Sensory Screen for Light Touch- Lower Quarter Clearing    L1 (inguinal area)  Bilateral:;Intact  -RT    L2 (anterior mid thigh)  Bilateral:;Intact  -RT    L3 (distal anterior thigh)  Bilateral:;Intact  -RT    L4 (medial lower leg/foot)  Bilateral:;Intact  -RT    L5 (lateral lower leg/great toe)  Bilateral:;Intact  -RT    S1 (bottom of foot)  Intact;Bilateral:  -RT       Myotomal Screen- Lower Quarter Clearing    Hip flexion (L2)  Bilateral:;4+ (Good +)  -RT    Knee extension (L3)  Bilateral:;4+ (Good +)  -RT    Knee flexion (S2)  Bilateral:;4+ (Good +)  -RT      User Key  (r) = Recorded By, (t) = Taken By, (c) =  Cosigned By    Initials Name Provider Type    RT Keon Walker, PT Physical Therapist                    PT Assessment/Plan     Row Name 01/11/19 1513          PT Assessment    Assessment Comments  Tx today consisted of performance of eppley maneuver to right followed by self performance with cues and review and written instructions given to patient.  Pt will call therapy if needed in the next two weeks.  Pt responded well to tx today with decreased dizziness following.  -RT        PT Plan    PT Plan Comments  Will discharge pt in 2week if patient does not contact therapy.  -RT       User Key  (r) = Recorded By, (t) = Taken By, (c) = Cosigned By    Initials Name Provider Type    RT Keon Walker, PT Physical Therapist               Exercises     Row Name 01/11/19 1500             Subjective Comments    Subjective Comments  Pt reports being 50-75% improved with decresaed dizziness.  -RT         Total Minutes    53292 - PT Therapeutic Exercise Minutes  30  -RT         Exercise 1    Exercise Name 1  Eppley maneuver performed to right side x2 with education on self performance.  Pt given written instructions with review.  -RT      Cueing 1  Verbal;Tactile;Demo  -RT      Time 1  30  -RT        User Key  (r) = Recorded By, (t) = Taken By, (c) = Cosigned By    Initials Name Provider Type    RT Keon Walker, PT Physical Therapist                          Therapy Education  Given: HEP, Symptoms/condition management  Program: Reinforced  How Provided: Verbal, Demonstration, Written  Provided to: Patient  Level of Understanding: Verbalized, Demonstrated              Time Calculation:   Start Time: 1405  Stop Time: 1445  Time Calculation (min): 40 min   Therapy Suggested Charges     Code   Minutes Charges    59820 (CPT®) Hc Pt Neuromusc Re Education Ea 15 Min      87414 (CPT®) Hc Pt Ther Proc Ea 15 Min 30 2    94648 (CPT®) Hc Gait Training Ea 15 Min      18770 (CPT®) Hc Pt Therapeutic Act Ea 15 Min      18734 (CPT®)  Hc Pt Manual Therapy Ea 15 Min      12922 (CPT®) Hc Pt Ther Massage- Per 15 Min      02200 (CPT®) Hc Pt Iontophoresis Ea 15 Min      54469 (CPT®) Hc Pt Elec Stim Ea-Per 15 Min      40499 (CPT®) Hc Pt Ultrasound Ea 15 Min      72349 (CPT®) Hc Pt Self Care/Mgmt/Train Ea 15 Min      68168 (CPT®) Hc Pt Prosthetic (S) Train Initial Encounter, Each 15 Min      80665 (CPT®) Hc Orthotic(S) Mgmt/Train Initial Encounter, Each 15min      65781 (CPT®) Hc Pt Aquatic Therapy Ea 15 Min      30637 (CPT®) Hc Pt Orthotic(S)/Prosthetic(S) Encounter, Each 15 Min       (CPT®) Hc Pt Electrical Stim Unattended      Total  30 2        Therapy Charges for Today     Code Description Service Date Service Provider Modifiers Qty    08445002937 HC PT THER PROC EA 15 MIN 1/11/2019 Keon Walker, PT GP 2                   Keon Walker, PT  1/11/2019

## 2019-02-26 ENCOUNTER — DOCUMENTATION (OUTPATIENT)
Dept: PHYSICAL THERAPY | Facility: HOSPITAL | Age: 74
End: 2019-02-26

## 2019-02-26 DIAGNOSIS — R42 VERTIGO: Primary | ICD-10-CM

## 2019-02-26 DIAGNOSIS — R26.89 BALANCE PROBLEMS: ICD-10-CM

## 2019-10-11 ENCOUNTER — HOSPITAL ENCOUNTER (OUTPATIENT)
Dept: MAMMOGRAPHY | Facility: HOSPITAL | Age: 74
Discharge: HOME | End: 2019-10-11
Admitting: INTERNAL MEDICINE

## 2019-10-11 DIAGNOSIS — Z12.31 VISIT FOR SCREENING MAMMOGRAM: ICD-10-CM

## 2019-10-11 PROCEDURE — 77063 BREAST TOMOSYNTHESIS BI: CPT

## 2019-10-11 PROCEDURE — 77067 SCR MAMMO BI INCL CAD: CPT | Performed by: RADIOLOGY

## 2019-10-11 PROCEDURE — 77067 SCR MAMMO BI INCL CAD: CPT

## 2019-10-11 PROCEDURE — 77063 BREAST TOMOSYNTHESIS BI: CPT | Performed by: RADIOLOGY

## 2020-10-27 ENCOUNTER — HOSPITAL ENCOUNTER (OUTPATIENT)
Dept: MAMMOGRAPHY | Facility: HOSPITAL | Age: 75
Discharge: HOME | End: 2020-10-27
Admitting: INTERNAL MEDICINE

## 2020-10-27 DIAGNOSIS — Z12.31 VISIT FOR SCREENING MAMMOGRAM: ICD-10-CM

## 2020-10-27 PROCEDURE — 77063 BREAST TOMOSYNTHESIS BI: CPT | Performed by: RADIOLOGY

## 2020-10-27 PROCEDURE — 77063 BREAST TOMOSYNTHESIS BI: CPT

## 2020-10-27 PROCEDURE — 77067 SCR MAMMO BI INCL CAD: CPT

## 2020-10-27 PROCEDURE — 77067 SCR MAMMO BI INCL CAD: CPT | Performed by: RADIOLOGY

## 2021-02-17 DIAGNOSIS — Z23 IMMUNIZATION DUE: ICD-10-CM

## 2021-11-08 ENCOUNTER — APPOINTMENT (OUTPATIENT)
Dept: MAMMOGRAPHY | Facility: HOSPITAL | Age: 76
End: 2021-11-08

## 2023-06-16 ENCOUNTER — LAB REQUISITION (OUTPATIENT)
Dept: LAB | Facility: HOSPITAL | Age: 78
End: 2023-06-16
Payer: MEDICARE

## 2023-06-16 DIAGNOSIS — I10 ESSENTIAL (PRIMARY) HYPERTENSION: ICD-10-CM

## 2023-06-16 LAB
ALBUMIN SERPL-MCNC: 3 G/DL (ref 3.5–5.2)
ALBUMIN/GLOB SERPL: 1.3 G/DL
ALP SERPL-CCNC: 70 U/L (ref 39–117)
ALT SERPL W P-5'-P-CCNC: 12 U/L (ref 1–33)
ANION GAP SERPL CALCULATED.3IONS-SCNC: 8.2 MMOL/L (ref 5–15)
ANISOCYTOSIS BLD QL: ABNORMAL
AST SERPL-CCNC: 18 U/L (ref 1–32)
BILIRUB SERPL-MCNC: 0.2 MG/DL (ref 0–1.2)
BUN SERPL-MCNC: 12 MG/DL (ref 8–23)
BUN/CREAT SERPL: 14.6 (ref 7–25)
CALCIUM SPEC-SCNC: 8.7 MG/DL (ref 8.6–10.5)
CHLORIDE SERPL-SCNC: 110 MMOL/L (ref 98–107)
CHOLEST SERPL-MCNC: 115 MG/DL (ref 0–200)
CO2 SERPL-SCNC: 22.8 MMOL/L (ref 22–29)
CREAT SERPL-MCNC: 0.82 MG/DL (ref 0.57–1)
DEPRECATED RDW RBC AUTO: 51.7 FL (ref 37–54)
EGFRCR SERPLBLD CKD-EPI 2021: 73.3 ML/MIN/1.73
EOSINOPHIL # BLD MANUAL: 0.11 10*3/MM3 (ref 0–0.4)
EOSINOPHIL NFR BLD MANUAL: 1 % (ref 0.3–6.2)
ERYTHROCYTE [DISTWIDTH] IN BLOOD BY AUTOMATED COUNT: 15.1 % (ref 12.3–15.4)
GLOBULIN UR ELPH-MCNC: 2.3 GM/DL
GLUCOSE SERPL-MCNC: 107 MG/DL (ref 65–99)
HCT VFR BLD AUTO: 23.9 % (ref 34–46.6)
HDLC SERPL-MCNC: 37 MG/DL (ref 40–60)
HGB BLD-MCNC: 7.3 G/DL (ref 12–15.9)
LDLC SERPL CALC-MCNC: 60 MG/DL (ref 0–100)
LDLC/HDLC SERPL: 1.59 {RATIO}
LYMPHOCYTES # BLD MANUAL: 1.32 10*3/MM3 (ref 0.7–3.1)
LYMPHOCYTES NFR BLD MANUAL: 4 % (ref 5–12)
MCH RBC QN AUTO: 29 PG (ref 26.6–33)
MCHC RBC AUTO-ENTMCNC: 30.5 G/DL (ref 31.5–35.7)
MCV RBC AUTO: 94.8 FL (ref 79–97)
MONOCYTES # BLD: 0.44 10*3/MM3 (ref 0.1–0.9)
NEUTROPHILS # BLD AUTO: 9.13 10*3/MM3 (ref 1.7–7)
NEUTROPHILS NFR BLD MANUAL: 76 % (ref 42.7–76)
NEUTS BAND NFR BLD MANUAL: 7 % (ref 0–5)
NRBC SPEC MANUAL: 1 /100 WBC (ref 0–0.2)
PLATELET # BLD AUTO: 431 10*3/MM3 (ref 140–450)
PMV BLD AUTO: 10 FL (ref 6–12)
POTASSIUM SERPL-SCNC: 4.1 MMOL/L (ref 3.5–5.2)
PROT SERPL-MCNC: 5.3 G/DL (ref 6–8.5)
RBC # BLD AUTO: 2.52 10*6/MM3 (ref 3.77–5.28)
SCAN SLIDE: NORMAL
SMALL PLATELETS BLD QL SMEAR: ABNORMAL
SODIUM SERPL-SCNC: 141 MMOL/L (ref 136–145)
TRIGL SERPL-MCNC: 95 MG/DL (ref 0–150)
VARIANT LYMPHS NFR BLD MANUAL: 12 % (ref 19.6–45.3)
VLDLC SERPL-MCNC: 18 MG/DL (ref 5–40)
WBC NRBC COR # BLD: 11 10*3/MM3 (ref 3.4–10.8)

## 2023-06-16 PROCEDURE — 80053 COMPREHEN METABOLIC PANEL: CPT | Performed by: INTERNAL MEDICINE

## 2023-06-16 PROCEDURE — 80061 LIPID PANEL: CPT | Performed by: INTERNAL MEDICINE

## 2023-06-16 PROCEDURE — 85025 COMPLETE CBC W/AUTO DIFF WBC: CPT | Performed by: INTERNAL MEDICINE

## 2023-06-17 ENCOUNTER — LAB REQUISITION (OUTPATIENT)
Dept: LAB | Facility: HOSPITAL | Age: 78
End: 2023-06-17
Payer: COMMERCIAL

## 2023-06-17 DIAGNOSIS — D64.9 ANEMIA, UNSPECIFIED: ICD-10-CM

## 2023-06-17 LAB — 25(OH)D3 SERPL-MCNC: 25.6 NG/ML (ref 30–100)

## 2023-06-17 PROCEDURE — 82306 VITAMIN D 25 HYDROXY: CPT | Performed by: INTERNAL MEDICINE

## 2023-06-27 PROBLEM — D64.9 ANEMIA: Status: ACTIVE | Noted: 2023-06-27

## 2023-07-28 ENCOUNTER — LAB REQUISITION (OUTPATIENT)
Dept: LAB | Facility: HOSPITAL | Age: 78
End: 2023-07-28
Payer: MEDICARE

## 2023-07-28 DIAGNOSIS — I10 ESSENTIAL (PRIMARY) HYPERTENSION: ICD-10-CM

## 2023-07-28 DIAGNOSIS — D64.9 ANEMIA, UNSPECIFIED: ICD-10-CM

## 2023-07-28 LAB
ANION GAP SERPL CALCULATED.3IONS-SCNC: 8.2 MMOL/L (ref 5–15)
BASOPHILS # BLD AUTO: 0.06 10*3/MM3 (ref 0–0.2)
BASOPHILS NFR BLD AUTO: 1 % (ref 0–1.5)
BUN SERPL-MCNC: 20 MG/DL (ref 8–23)
BUN/CREAT SERPL: 24.1 (ref 7–25)
CALCIUM SPEC-SCNC: 8.9 MG/DL (ref 8.6–10.5)
CHLORIDE SERPL-SCNC: 109 MMOL/L (ref 98–107)
CO2 SERPL-SCNC: 24.8 MMOL/L (ref 22–29)
CREAT SERPL-MCNC: 0.83 MG/DL (ref 0.57–1)
DEPRECATED RDW RBC AUTO: 53.4 FL (ref 37–54)
EGFRCR SERPLBLD CKD-EPI 2021: 72.3 ML/MIN/1.73
EOSINOPHIL # BLD AUTO: 0.66 10*3/MM3 (ref 0–0.4)
EOSINOPHIL NFR BLD AUTO: 10.7 % (ref 0.3–6.2)
ERYTHROCYTE [DISTWIDTH] IN BLOOD BY AUTOMATED COUNT: 15.1 % (ref 12.3–15.4)
GLUCOSE SERPL-MCNC: 80 MG/DL (ref 65–99)
HCT VFR BLD AUTO: 32.4 % (ref 34–46.6)
HGB BLD-MCNC: 9.7 G/DL (ref 12–15.9)
IMM GRANULOCYTES # BLD AUTO: 0.03 10*3/MM3 (ref 0–0.05)
IMM GRANULOCYTES NFR BLD AUTO: 0.5 % (ref 0–0.5)
LYMPHOCYTES # BLD AUTO: 1.6 10*3/MM3 (ref 0.7–3.1)
LYMPHOCYTES NFR BLD AUTO: 26.1 % (ref 19.6–45.3)
MCH RBC QN AUTO: 28.5 PG (ref 26.6–33)
MCHC RBC AUTO-ENTMCNC: 29.9 G/DL (ref 31.5–35.7)
MCV RBC AUTO: 95.3 FL (ref 79–97)
MONOCYTES # BLD AUTO: 0.57 10*3/MM3 (ref 0.1–0.9)
MONOCYTES NFR BLD AUTO: 9.3 % (ref 5–12)
NEUTROPHILS NFR BLD AUTO: 3.22 10*3/MM3 (ref 1.7–7)
NEUTROPHILS NFR BLD AUTO: 52.4 % (ref 42.7–76)
NRBC BLD AUTO-RTO: 0 /100 WBC (ref 0–0.2)
PLATELET # BLD AUTO: 306 10*3/MM3 (ref 140–450)
PMV BLD AUTO: 10.2 FL (ref 6–12)
POTASSIUM SERPL-SCNC: 4.4 MMOL/L (ref 3.5–5.2)
RBC # BLD AUTO: 3.4 10*6/MM3 (ref 3.77–5.28)
SODIUM SERPL-SCNC: 142 MMOL/L (ref 136–145)
WBC NRBC COR # BLD: 6.14 10*3/MM3 (ref 3.4–10.8)

## 2023-07-28 PROCEDURE — 85025 COMPLETE CBC W/AUTO DIFF WBC: CPT | Performed by: INTERNAL MEDICINE

## 2023-07-28 PROCEDURE — 80048 BASIC METABOLIC PNL TOTAL CA: CPT | Performed by: INTERNAL MEDICINE

## 2023-08-07 ENCOUNTER — LAB REQUISITION (OUTPATIENT)
Dept: LAB | Facility: HOSPITAL | Age: 78
End: 2023-08-07
Payer: COMMERCIAL

## 2023-08-07 DIAGNOSIS — I10 ESSENTIAL (PRIMARY) HYPERTENSION: ICD-10-CM

## 2023-08-07 DIAGNOSIS — E55.9 VITAMIN D DEFICIENCY, UNSPECIFIED: ICD-10-CM

## 2023-08-07 PROCEDURE — 82306 VITAMIN D 25 HYDROXY: CPT | Performed by: NURSE PRACTITIONER

## 2023-08-08 LAB — 25(OH)D3 SERPL-MCNC: 47.2 NG/ML (ref 30–100)

## 2023-08-09 ENCOUNTER — OFFICE VISIT (OUTPATIENT)
Dept: UROLOGY | Facility: CLINIC | Age: 78
End: 2023-08-09
Payer: MEDICARE

## 2023-08-09 VITALS
OXYGEN SATURATION: 99 % | WEIGHT: 202 LBS | BODY MASS INDEX: 35.79 KG/M2 | HEIGHT: 63 IN | DIASTOLIC BLOOD PRESSURE: 74 MMHG | SYSTOLIC BLOOD PRESSURE: 124 MMHG | HEART RATE: 79 BPM

## 2023-08-09 DIAGNOSIS — R32 INCONTINENCE OF URINE IN FEMALE: ICD-10-CM

## 2023-08-09 DIAGNOSIS — N28.89 LEFT RENAL MASS: Primary | ICD-10-CM

## 2023-08-09 RX ORDER — LACTULOSE 10 G/15ML
30 SOLUTION ORAL NIGHTLY
COMMUNITY

## 2023-08-09 RX ORDER — ACETAMINOPHEN 500 MG
500 TABLET ORAL EVERY 4 HOURS PRN
COMMUNITY

## 2023-08-09 RX ORDER — IPRATROPIUM BROMIDE 42 UG/1
2 SPRAY, METERED NASAL
COMMUNITY
Start: 2023-06-23 | End: 2024-06-22

## 2023-08-09 NOTE — PROGRESS NOTES
Office Visit New Urology      Patient Name: Layne Case  : 1945   MRN: [id number]     Chief Complaint:    Chief Complaint   Patient presents with    Renal Lesion        Referring Provider: Bertin Broussard AP*    History of Present Illness: Layne Case is a 77 y.o. female who presents to Urology today for incidentally identified small left renal mass. She underwent CT scan to evaluate the sigmoid colon, which lead to identification of a 2.1cm left renal mass on the lower pole medial aspect. MRI of the abdomen was completed to confirm these findings. No signs of adenopathy. She has no symptoms from this. No hematuria or dysuria, though she recently was told she had a UTI during her hospitalization related to a knee replacement.    She is currently in a nursing home for rehabilitation related to her recent knee replacement. She is not yet able to walk. She has been having some urge and unaware incontinence which has been ongoing over the last couple months. She has not tried anything for this.    She has no tobacco use history. No family history of  malignancy.    Subjective      Review of System: Review of Systems   I have reviewed the ROS documented by my clinical staff, I have updated appropriately and I agree. Alonso Eckert MD    Past Medical History:   Past Medical History:   Diagnosis Date    Abnormal mammogram     Anxiety     Arthritis     Bray esophagus     Hypertension        Past Surgical History:   Past Surgical History:   Procedure Laterality Date    COLONOSCOPY N/A 2023    Procedure: COLONOSCOPY;  Surgeon: Jatin Rosen MD;  Location: [facility];  Service: Gastroenterology;  Laterality: N/A;    ENDOSCOPY N/A 2023    Procedure: ESOPHAGOGASTRODUODENOSCOPY;  Surgeon: Jatin Rosen MD;  Location: [facility];  Service: Gastroenterology;  Laterality: N/A;    ENDOSCOPY AND COLONOSCOPY  2017    HYSTERECTOMY      VENTRAL/INCISIONAL HERNIA REPAIR N/A 2017     Procedure: VENTRAL/INCISIONAL HERNIA REPAIR LAPAROSCOPIC WITH PATCH;  Surgeon: Jf Gorman MD;  Location: [facility] OR;  Service:        Family History:   Family History   Problem Relation Age of Onset    Breast cancer Maternal Grandmother        Social History:   Social History     Socioeconomic History    Marital status:    Tobacco Use    Smoking status: Never     Passive exposure: Never    Smokeless tobacco: Never   Vaping Use    Vaping Use: Never used   Substance and Sexual Activity    Alcohol use: No    Drug use: No    Sexual activity: Defer       Medications:     Current Outpatient Medications:     aspirin 325 MG EC tablet, Take once a day -- HOLD FOR 1 WEEK., Disp: , Rfl:     Calcium Carb-Cholecalciferol (CALCIUM/VITAMIN D PO), Calcium-Vitamin D  Daily, Disp: , Rfl:     Cholecalciferol 25 MCG (1000 UT) capsule, Take 1 capsule by mouth Daily., Disp: , Rfl:     Cyanocobalamin (Vitamin B12) 1000 MCG tablet controlled-release, Vitamin B12, Disp: , Rfl:     cyclobenzaprine (FLEXERIL) 10 MG tablet, , Disp: , Rfl:     DULoxetine (CYMBALTA) 60 MG capsule, , Disp: , Rfl:     FeroSul 325 (65 Fe) MG tablet, , Disp: , Rfl:     fluticasone (FLONASE) 50 MCG/ACT nasal spray, 1 spray into the nostril(s) as directed by provider Daily., Disp: , Rfl:     folic acid (FOLVITE) 1 MG tablet, Daily., Disp: , Rfl:     furosemide (LASIX) 40 MG tablet, Take 1 tablet by mouth Daily., Disp: , Rfl:     HYDROcodone-acetaminophen (NORCO)  MG per tablet, Take 1 tablet by mouth Every 6 (Six) Hours As Needed., Disp: , Rfl:     ipratropium (ATROVENT) 0.06 % nasal spray, 2 sprays into the nostril(s) as directed by provider., Disp: , Rfl:     lisinopril (PRINIVIL,ZESTRIL) 20 MG tablet, Take 1 tablet by mouth Daily., Disp: , Rfl:     omeprazole (priLOSEC) 20 MG capsule, Take 1 capsule by mouth Daily., Disp: 30 capsule, Rfl: 1    polyethylene glycol (MIRALAX) 17 g packet, Take 17 g by mouth Daily., Disp: 17 packet, Rfl: 0     "potassium chloride (MICRO-K) 10 MEQ CR capsule, , Disp: , Rfl:     pravastatin (PRAVACHOL) 40 MG tablet, Take 1 tablet by mouth Every Other Day., Disp: , Rfl:     rOPINIRole (REQUIP) 1 MG tablet, Take 2 tablets by mouth Every Morning., Disp: , Rfl:     acetaminophen (TYLENOL) 500 MG tablet, Take 1 tablet by mouth Every 4 (Four) Hours As Needed., Disp: , Rfl:     lactulose (CHRONULAC) 10 GM/15ML solution, Take 30 mL by mouth Every Night., Disp: , Rfl:     Allergies:   Allergies   Allergen Reactions    Azithromycin Swelling and Other (See Comments)        Objective     Physical Exam:   Vital Signs:   Vitals:    08/09/23 1045   BP: 124/74   Pulse: 79   SpO2: 99%   Weight: 91.6 kg (202 lb)   Height: 160 cm (63\")   PainSc: 0-No pain     Body mass index is 35.78 kg/mý.     GEN: NAD, alert and oriented, wheelchair bound  HEENT: NCAT, EOMI  RESP: Equal bilateral chest rise, normal work of breathing  CV: Regular rate, appears well perfused  ABD: Soft, non-tender, non-distended  : Deferred  Ext: No gross deformities, normal ROM  MSK: Full ROM in the bilateral upper extremities  NEURO: No focal deficits  PSYCH: Normal mood and affect      Labs:   Brief Urine Lab Results  (Last result in the past 365 days)        Color   Clarity   Blood   Leuk Est   Nitrite   Protein   CREAT   Urine HCG        07/08/23 1837 Yellow   Turbid   Negative   Negative   Negative   Negative                        Lab Results   Component Value Date    GLUCOSE 80 07/28/2023    CALCIUM 8.9 07/28/2023     07/28/2023    K 4.4 07/28/2023    CO2 24.8 07/28/2023     (H) 07/28/2023    BUN 20 07/28/2023    CREATININE 0.83 07/28/2023    EGFRIFNONA 65 06/07/2017    BCR 24.1 07/28/2023    ANIONGAP 8.2 07/28/2023       Lab Results   Component Value Date    WBC 6.14 07/28/2023    HGB 9.7 (L) 07/28/2023    HCT 32.4 (L) 07/28/2023    MCV 95.3 07/28/2023     07/28/2023       Images:   XR Femur 2 View Left    Result Date: 7/13/2023  1.  No acute " fracture or dislocation of the femur. 2.  Fibular head fracture without displacement.  This report was finalized on 7/13/2023 10:42 AM by Dr. Keith Parks MD.      XR Knee 3 View Left    Result Date: 7/13/2023    Fibular head fracture.  This report was finalized on 7/13/2023 10:45 AM by Dr. Keith Parks MD.      XR Tibia Fibula 2 View Left    Result Date: 7/13/2023  1.  Left knee prosthesis. 2.  Fracture of the fibular head without significant displacement.  This report was finalized on 7/13/2023 10:41 AM by Dr. Keith Parks MD.      CT Head Without Contrast    Result Date: 6/10/2023  1. Unremarkable unenhanced CT of the brain. 2. Active on chronic left maxillary sinusitis. This study was performed using dose reduction techniques to achieve radiation exposure as low as reasonably achievable (ALARA)     MRI Abdomen With & Without Contrast    Result Date: 7/6/2023  1.  Solid lesion of the left mid renal pole measuring 2 cm concerning for renal cell carcinoma. 2.  Moderate to large sized hiatal hernia. 3.  Multiple simple appearing bilateral renal cyst with the largest of the right kidney measuring 8.6 cm.  This report was finalized on 7/6/2023 4:08 PM by Dr. Ross Karimi MD.      CT Abdomen Pelvis With Contrast    Result Date: 6/30/2023  1.  Wall thickening of the sigmoid colon either reflecting post colonoscopy state or could reflect underlying changes of sigmoid colitis versus diverticulitis. No pneumatosis or perforation identified on CT. 2.  Mild fatty infiltration of liver. 3.  Enhancing solid lesion left kidney that is approximately 2.1 cm. MRI may be helpful to further characterize given features. Primary renal neoplasm is nonetheless favored. 4.  Nonobstructing left kidney stone. 5.  Large hiatal hernia. 6.  Sigmoid diverticulosis noted. 7.  Small fat-containing umbilical hernia. 8. Other incidental and nonacute findings as above.  This report was finalized on 6/30/2023 2:28 PM by Dr. Thierry Huynh MD.       XR Chest 1 View    Result Date: 6/10/2023  Unremarkable portable chest.     XR knee visionaire protocol    Result Date: 6/1/2023  Postoperative changes as described above. Images reviewed, interpreted, and dictated by Antony Dai DO    US abdomen limited    Result Date: 6/11/2023  1. Fatty change of liver. 2. Large right renal cyst. 3. Otherwise unremarkable right upper quadrant ultrasound.     XR KNEE 4 VIEWS LEFT Non-Weight Bearing    Result Date: 6/10/2023  Worsening soft tissue swelling and joint effusion. Post arthroplasty changes. Images reviewed, interpreted, and dictated by Dr. Wyatt Edwards. Transcribed by Aniyah Yanez PA-C.    US DOPPLER VENOUS LEG LEFT    Result Date: 6/4/2023  No evidence of left lower extremity deep venous thrombosis. CRITICAL RESULT: No. COMMUNICATION: Per this written report. Images personally reviewed, interpreted, and dictated by THUAN Matthews.    CT lower extremity without IV contrast left    Result Date: 4/17/2023  No acute disease. Images reviewed, interpreted and dictated by Dr. Thierry Astorga MD    XR hip 2 views left    Result Date: 4/17/2023  No definite displaced pelvic or left hip fractures. Images personally reviewed, interpreted and dictated by DAVID Davidson M.D.    XR Hip With or Without Pelvis 2 - 3 View Left    Result Date: 7/13/2023    No acute findings in the left hip.  This report was finalized on 7/13/2023 10:40 AM by Dr. Keith Parks MD.        Measures:   Tobacco:   Layne Case  reports that she has never smoked. She has never been exposed to tobacco smoke. She has never used smokeless tobacco.    Urine Incontinence: ( NOUI)  Unaware and urge incontinence   Assessment / Plan      Assessment/Plan:   Layne Case is a 77 y.o. female who presented today for incidentally identified small left renal mass measuring 2.1cm on the left lower pole. We discussed the pathophysiology of small renal masses. There is around an 80% chance that this  represents a malignancy based on imaging. We discussed management options for small renal masses, including active surveillance, renal mass biopsy, ablation, partial nephrectomy, or radical nephrectomy. Given the small size of this mass, we recommended active surveillance, to which she agreed. We will plan to see her back in 6 months with repeat imaging. If she is to require treatment, this mass may be amenable to ablation.    With regards to her incontinence, we will continue to monitor and see if this improves as she gets back to her baseline. If it does not, we may consider further work up vs a trial of OAB medications.    Diagnoses and all orders for this visit:    1. Left renal mass (Primary)  -     CT Abdomen Pelvis With & Without Contrast; Future    2. Incontinence of urine in female           Follow Up:   Return in about 6 months (around 2/9/2024) for Return in 6mo with CT scan prior.    I spent approximately 45 minutes providing clinical care for this patient; including review of patient's chart and provider documentation, face to face time spent with patient in examination room (obtaining history, performing physical exam, discussing diagnosis and management options), placing orders, and completing patient documentation.     Alonso Eckert MD  [facility] [place]    I have reviewed the notes, assessments, and/or procedures performed with resident physician, Nigel Garcia MD, I concur with her/his documentation of Layne Case.

## 2023-10-09 DIAGNOSIS — R93.89 ABNORMAL FINDINGS ON DIAGNOSTIC IMAGING OF OTHER SPECIFIED BODY STRUCTURES: Primary | ICD-10-CM

## 2023-10-10 RX ORDER — OMEPRAZOLE 20 MG/1
20 CAPSULE, DELAYED RELEASE ORAL DAILY
Qty: 30 CAPSULE | Refills: 1 | Status: SHIPPED | OUTPATIENT
Start: 2023-10-10 | End: 2024-10-09

## 2024-03-31 DIAGNOSIS — R93.89 ABNORMAL FINDINGS ON DIAGNOSTIC IMAGING OF OTHER SPECIFIED BODY STRUCTURES: ICD-10-CM

## 2024-04-01 RX ORDER — OMEPRAZOLE 20 MG/1
20 CAPSULE, DELAYED RELEASE ORAL DAILY
Qty: 60 CAPSULE | Refills: 5 | OUTPATIENT
Start: 2024-04-01

## 2024-04-02 ENCOUNTER — OFFICE VISIT (OUTPATIENT)
Dept: UROLOGY | Facility: CLINIC | Age: 79
End: 2024-04-02
Payer: MEDICARE

## 2024-04-02 DIAGNOSIS — N28.89 RENAL MASS: Primary | ICD-10-CM

## 2024-04-02 PROCEDURE — 1159F MED LIST DOCD IN RCRD: CPT | Performed by: UROLOGY

## 2024-04-02 PROCEDURE — 1160F RVW MEDS BY RX/DR IN RCRD: CPT | Performed by: UROLOGY

## 2024-04-02 PROCEDURE — 99214 OFFICE O/P EST MOD 30 MIN: CPT | Performed by: UROLOGY

## 2024-04-02 RX ORDER — FAMOTIDINE 20 MG/1
TABLET, FILM COATED ORAL
COMMUNITY

## 2024-04-02 NOTE — PROGRESS NOTES
Follow Up Office Visit      Patient Name: Layne Case  : 1945   MRN: [id number]     Chief Complaint:    Chief Complaint   Patient presents with    Renal lesion       History of Present Illness: Layne Case is a 78 y.o. female who presents today for follow up with surveillance imaging for left renal lesion.  She was initially seen 2023 after identification of incidental 2.1 cm left renal lesion concerning for possible renal mass.  Patient presents today for 6-month follow-up with surveillance.  Completed cross-sectional CT imaging which reveals again 2 cm hyperdense lesion on the left kidney, there is mild enhancement, additional nonenhancing bilateral renal cyst.  Left nonobstructing renal stone.  Patient denies any current lower urinary tract symptoms, flank pain, hematuria    Subjective      Review of System: Review of Systems   Genitourinary:  Negative for decreased urine volume, difficulty urinating, dysuria, enuresis, flank pain, frequency, hematuria and urgency.      I have reviewed the ROS documented by my clinical staff, updated as appropriate and I agree. Alonso Eckert MD    I have reviewed and the following portions of the patient's history were updated as appropriate: past family history, past medical history, past social history, past surgical history and problem list.    Medications:     Current Outpatient Medications:     Cholecalciferol 25 MCG (1000 UT) capsule, Take 1 capsule by mouth Daily., Disp: , Rfl:     Cyanocobalamin (Vitamin B12) 1000 MCG tablet controlled-release, Vitamin B12, Disp: , Rfl:     DULoxetine (CYMBALTA) 60 MG capsule, , Disp: , Rfl:     famotidine (PEPCID) 20 MG tablet, , Disp: , Rfl:     FeroSul 325 (65 Fe) MG tablet, , Disp: , Rfl:     fluticasone (FLONASE) 50 MCG/ACT nasal spray, 1 spray into the nostril(s) as directed by provider Daily., Disp: , Rfl:     lisinopril (PRINIVIL,ZESTRIL) 20 MG tablet, Take 1 tablet by mouth Daily., Disp: , Rfl:     pravastatin  (PRAVACHOL) 40 MG tablet, Take 1 tablet by mouth Every Other Day., Disp: , Rfl:     rOPINIRole (REQUIP) 1 MG tablet, Take 2 tablets by mouth Every Morning., Disp: , Rfl:     acetaminophen (TYLENOL) 500 MG tablet, Take 1 tablet by mouth Every 4 (Four) Hours As Needed. (Patient not taking: Reported on 4/2/2024), Disp: , Rfl:     aspirin 325 MG EC tablet, Take once a day -- HOLD FOR 1 WEEK. (Patient not taking: Reported on 4/2/2024), Disp: , Rfl:     Calcium Carb-Cholecalciferol (CALCIUM/VITAMIN D PO), Calcium-Vitamin D  Daily (Patient not taking: Reported on 4/2/2024), Disp: , Rfl:     cyclobenzaprine (FLEXERIL) 10 MG tablet, , Disp: , Rfl:     folic acid (FOLVITE) 1 MG tablet, Daily., Disp: , Rfl:     furosemide (LASIX) 40 MG tablet, Take 1 tablet by mouth Daily. (Patient not taking: Reported on 4/2/2024), Disp: , Rfl:     HYDROcodone-acetaminophen (NORCO)  MG per tablet, Take 1 tablet by mouth Every 6 (Six) Hours As Needed. (Patient not taking: Reported on 4/2/2024), Disp: , Rfl:     ipratropium (ATROVENT) 0.06 % nasal spray, 2 sprays into the nostril(s) as directed by provider. (Patient not taking: Reported on 4/2/2024), Disp: , Rfl:     lactulose (CHRONULAC) 10 GM/15ML solution, Take 30 mL by mouth Every Night. (Patient not taking: Reported on 4/2/2024), Disp: , Rfl:     omeprazole (priLOSEC) 20 MG capsule, Take 1 capsule by mouth Daily. (Patient not taking: Reported on 4/2/2024), Disp: 30 capsule, Rfl: 1    polyethylene glycol (MIRALAX) 17 g packet, Take 17 g by mouth Daily. (Patient not taking: Reported on 4/2/2024), Disp: 17 packet, Rfl: 0    potassium chloride (MICRO-K) 10 MEQ CR capsule, , Disp: , Rfl:     Allergies:   Allergies   Allergen Reactions    Azithromycin Swelling and Other (See Comments)       Objective     Physical Exam:   Vital Signs: There were no vitals filed for this visit.  There is no height or weight on file to calculate BMI.     Physical Exam  Vitals and nursing note reviewed.    Constitutional:       Appearance: Normal appearance.   HENT:      Head: Normocephalic and atraumatic.   Cardiovascular:      Comments: Well perfused  Pulmonary:      Effort: Pulmonary effort is normal.   Abdominal:      General: Abdomen is flat.      Palpations: Abdomen is soft.   Musculoskeletal:         General: Normal range of motion.   Skin:     General: Skin is warm and dry.   Neurological:      General: No focal deficit present.      Mental Status: She is alert and oriented to person, place, and time. Mental status is at baseline.   Psychiatric:         Mood and Affect: Mood normal.         Behavior: Behavior normal.         Thought Content: Thought content normal.         Judgment: Judgment normal.             Labs:   Brief Urine Lab Results  (Last result in the past 365 days)        Color   Clarity   Blood   Leuk Est   Nitrite   Protein   CREAT   Urine HCG        07/08/23 1837 Yellow   Turbid   Negative   Negative   Negative   Negative                        Lab Results   Component Value Date    GLUCOSE 80 07/28/2023    CALCIUM 8.9 07/28/2023     07/28/2023    K 4.4 07/28/2023    CO2 24.8 07/28/2023     (H) 07/28/2023    BUN 20 07/28/2023    CREATININE 0.83 07/28/2023    EGFRIFNONA 65 06/07/2017    BCR 24.1 07/28/2023    ANIONGAP 8.2 07/28/2023       Lab Results   Component Value Date    WBC 6.14 07/28/2023    HGB 9.7 (L) 07/28/2023    HCT 32.4 (L) 07/28/2023    MCV 95.3 07/28/2023     07/28/2023       Images:   CT Abdomen Pelvis With Contrast    Result Date: 3/29/2024  1. There is a 2 cm hyperdense lesion in the inferomedial left kidney on unenhanced images. This demonstrates enhancement on arterial, portovenous, and delayed images. This is consistent with a renal neoplasm. Recommend urology consultation. 2. Nonenhancing cysts in the kidneys bilaterally with a large 8.9 cm right renal cyst. 3. Nonobstructing left renal stone. There is no hydronephrosis in either kidney. 4. Large hiatal  "hernia. 5. Increased stool throughout the colon. Images reviewed, interpreted, dictated and electronically signed by Jean-Pierre Rosales MD Voice transcription technology (Power Scribe) is used for the dictation of this note and \"sound-alike\" words might be erroneously placed despite reviewing this note for accuracy. Errors in dictation may reflect use of voice recognition software and not all errors in transcription may have been detected prior to signing.    MRI Cyberknife Lumbar Spine Without Contrast    Result Date: 2/7/2024  Marked degenerative lumbar spondylosis with dextroscoliosis. Most prominent foraminal stenosis is on the left at L1-2 and L2-3. Most prominent spinal stenosis is at L4-5 level. The areas of stenosis are multifactorial in etiology due to both bone, disc narrowing, and disc protrusions. No compression of the cord or conus medullaris identified. No fracture seen. Slight progression since the comparison. Images reviewed, interpreted, and dictated by Domenica Gomez MD      Measures:   Tobacco:   Layne Case  reports that she has never smoked. She has never been exposed to tobacco smoke. She has never used smokeless tobacco. I have educated her on the risk of diseases from using tobacco products.            Urine Incontinence: ( NOUI)  Patient reports that she is not currently experiencing any symptoms of urinary incontinence.     Assessment / Plan      Assessment/Plan:   78 y.o. female is seen today for follow up with 6-month surveillance imaging for known 2 cm left mildly enhancing renal lesion concerning for possible renal mass.  We have previously discussed in depth management of small renal lesion, recommended surveillance given patient age and additional medical comorbidities.  Patient in agreement.  She presents today for 6 months imaging with demonstrate stability in size of lesion, continued 2 cm size, no change in characteristic or size.  We have again recommended continued surveillance given " her medical comorbidities.  She is in agreement, Clines desire for any further aggressive operative intervention.  We will continue to monitor if increasing kinetic is demonstrated we may further consider management.  She is understanding agreeable.  She will follow-up in 6 months with MRI for further characterization..     Diagnoses and all orders for this visit:    1. Renal mass (Primary)  -     MRI Abdomen With & Without Contrast; Future         Follow Up:   Return in about 6 months (around 10/2/2024) for Recheck, Follow up after Imaging.     I spent approximately 30 minutes providing clinical care for this patient; including review of patient's chart and provider documentation, face to face time spent with patient in examination room (obtaining history, performing physical exam, discussing diagnosis and management options), placing orders, and completing patient documentation.     Alonso Eckert MD  [facility] [place]

## 2024-04-08 PROBLEM — N28.89 RENAL MASS: Status: ACTIVE | Noted: 2024-04-08

## 2024-04-18 ENCOUNTER — TELEPHONE (OUTPATIENT)
Dept: UROLOGY | Facility: CLINIC | Age: 79
End: 2024-04-18

## 2024-06-17 ENCOUNTER — PATIENT ROUNDING (BHMG ONLY) (OUTPATIENT)
Dept: ORTHOPEDIC SURGERY | Facility: CLINIC | Age: 79
End: 2024-06-17
Payer: COMMERCIAL

## 2024-06-17 ENCOUNTER — OFFICE VISIT (OUTPATIENT)
Dept: ORTHOPEDIC SURGERY | Facility: CLINIC | Age: 79
End: 2024-06-17
Payer: MEDICARE

## 2024-06-17 VITALS
HEIGHT: 63 IN | WEIGHT: 200 LBS | DIASTOLIC BLOOD PRESSURE: 76 MMHG | HEART RATE: 82 BPM | BODY MASS INDEX: 35.44 KG/M2 | SYSTOLIC BLOOD PRESSURE: 130 MMHG | OXYGEN SATURATION: 94 %

## 2024-06-17 DIAGNOSIS — K21.00 GASTROESOPHAGEAL REFLUX DISEASE WITH ESOPHAGITIS, UNSPECIFIED WHETHER HEMORRHAGE: ICD-10-CM

## 2024-06-17 DIAGNOSIS — Z78.9 POOR TOLERANCE FOR AMBULATION: ICD-10-CM

## 2024-06-17 DIAGNOSIS — K22.719 BARRETT'S ESOPHAGUS WITH DYSPLASIA: ICD-10-CM

## 2024-06-17 DIAGNOSIS — M25.561 BILATERAL CHRONIC KNEE PAIN: Primary | ICD-10-CM

## 2024-06-17 DIAGNOSIS — M17.11 PRIMARY OSTEOARTHRITIS OF RIGHT KNEE: ICD-10-CM

## 2024-06-17 DIAGNOSIS — M25.562 BILATERAL CHRONIC KNEE PAIN: Primary | ICD-10-CM

## 2024-06-17 DIAGNOSIS — G89.29 BILATERAL CHRONIC KNEE PAIN: Primary | ICD-10-CM

## 2024-06-17 DIAGNOSIS — M17.11 PRIMARY LOCALIZED OSTEOARTHROSIS OF RIGHT LOWER LEG: ICD-10-CM

## 2024-06-17 DIAGNOSIS — R29.898 COMPLAINTS OF LEG WEAKNESS: ICD-10-CM

## 2024-06-17 RX ORDER — HYDROXYZINE 50 MG/1
50 TABLET, FILM COATED ORAL 3 TIMES DAILY PRN
COMMUNITY

## 2024-06-17 RX ORDER — ALENDRONATE SODIUM 70 MG/1
70 TABLET ORAL
COMMUNITY

## 2024-06-17 RX ADMIN — LIDOCAINE HYDROCHLORIDE 5 ML: 10 INJECTION, SOLUTION EPIDURAL; INFILTRATION; INTRACAUDAL; PERINEURAL at 13:30

## 2024-06-17 RX ADMIN — METHYLPREDNISOLONE ACETATE 80 MG: 80 INJECTION, SUSPENSION INTRA-ARTICULAR; INTRALESIONAL; INTRAMUSCULAR; SOFT TISSUE at 13:30

## 2024-06-17 NOTE — PROGRESS NOTES
New Patient Visit      Patient: Layne Case  YOB: 1945  Date of Encounter: 06/17/2024  PCP: My Franco MD  Referring Provider: No ref. provider found     Subjective   Layne Case is a 78 y.o. female who presents to the office today for evaluation of Initial Evaluation and Pain of the Left Knee and Initial Evaluation and Pain of the Right Knee      Chief Complaint   Patient presents with    Left Knee - Initial Evaluation, Pain    Right Knee - Initial Evaluation, Pain       History of Present Illness  The patient presents for a second opinion.    The patient underwent a successful left knee replacement on 06/01/2023, which has since resulted in an inability to walk. Despite being scheduled for right knee replacement a year ago, she was unable to undergo the procedure. Consequently, she underwent therapy and rehabilitation, which only provided minimal relief. She also suffers from chronic back pain, which began in her 40s. An x-ray of her back was performed due to her inability to walk, revealing a back issue rather than her knees. A right knee surgery was scheduled two weeks post-surgery, but was cancelled due to its ineffectiveness in addressing her knee condition. A back specialist was also consulted, who recommended physical therapy, which she has not yet attended. She reports weakness and pain in her legs when standing and walking, although she can move them while seated or in bed. However, she experiences instability in her legs when attempting to walk. Her left leg has shown improvement post-surgery, with no joint pain. Her back pain is intermittent, with occasional radiation down her legs. Prior to her surgery, she was able to walk well. However, her pain has intensified over the past two weeks, rendering her unable to drive. She denies any numbness or tingling in her legs or groin area. She was diagnosed with lymphedema 2 to 3 years ago, with swelling in her legs and ankles. Her feet do  not exhibit coldness. She has bunions and hammertoes on both feet, which cause her toes are curled and painful. She denies any known neurologic problems and is not diabetic. She has been on hydrocodone for an extended period, which provides relief but does not improve her mobility. Her current medications include Tylenol, calcium, Flexeril, B12, folic acid, iron, omeprazole, and Lasix. She has received gel injections in her left knee but has not received steroid injections in her right knee. She has not undergone a nerve study. She occasionally performs exercises for her knees. She was informed that she has scoliosis, degenerative changes at all levels of the lumbar spine and the lower thoracic spine, foraminal stenosis, and a big disc at L4-5. She has Bray's esophagus and acid reflux, for which she is prescribed medication. She underwent a bone density test recently.    Patient Active Problem List   Diagnosis    Abnormal mammogram    Umbilical hernia    Umbilical hernia without obstruction or gangrene    Anemia    Renal mass       Past Medical History:   Diagnosis Date    Abnormal mammogram     Anxiety     Arthritis     Bray esophagus     Hypertension        Past Surgical History:   Procedure Laterality Date    COLONOSCOPY N/A 06/30/2023    Procedure: COLONOSCOPY;  Surgeon: Jatin Rosen MD;  Location: [facility] OR;  Service: Gastroenterology;  Laterality: N/A;    ENDOSCOPY N/A 06/30/2023    Procedure: ESOPHAGOGASTRODUODENOSCOPY;  Surgeon: Jatin Rosen MD;  Location: [facility] OR;  Service: Gastroenterology;  Laterality: N/A;    ENDOSCOPY AND COLONOSCOPY  06/2017    HYSTERECTOMY      TOTAL KNEE ARTHROPLASTY Left     VENTRAL/INCISIONAL HERNIA REPAIR N/A 06/06/2017    Procedure: VENTRAL/INCISIONAL HERNIA REPAIR LAPAROSCOPIC WITH PATCH;  Surgeon: Jf Gorman MD;  Location: [facility] OR;  Service:        Family History   Problem Relation Age of Onset    Breast cancer Maternal Grandmother        Social  History     Socioeconomic History    Marital status:    Tobacco Use    Smoking status: Never     Passive exposure: Never    Smokeless tobacco: Never   Vaping Use    Vaping status: Never Used   Substance and Sexual Activity    Alcohol use: No    Drug use: No    Sexual activity: Defer       Current Outpatient Medications   Medication Sig Dispense Refill    acetaminophen (TYLENOL) 500 MG tablet Take 1 tablet by mouth Every 4 (Four) Hours As Needed. (Patient not taking: Reported on 4/2/2024)      aspirin 325 MG EC tablet Take once a day -- HOLD FOR 1 WEEK. (Patient not taking: Reported on 4/2/2024)      Calcium Carb-Cholecalciferol (CALCIUM/VITAMIN D PO) Calcium-Vitamin D   Daily (Patient not taking: Reported on 4/2/2024)      Cholecalciferol 25 MCG (1000 UT) capsule Take 1 capsule by mouth Daily.      Cyanocobalamin (Vitamin B12) 1000 MCG tablet controlled-release Vitamin B12      cyclobenzaprine (FLEXERIL) 10 MG tablet  (Patient not taking: Reported on 4/2/2024)      DULoxetine (CYMBALTA) 60 MG capsule       famotidine (PEPCID) 20 MG tablet       FeroSul 325 (65 Fe) MG tablet       fluticasone (FLONASE) 50 MCG/ACT nasal spray 1 spray into the nostril(s) as directed by provider Daily.      folic acid (FOLVITE) 1 MG tablet Daily.      furosemide (LASIX) 40 MG tablet Take 1 tablet by mouth Daily. (Patient not taking: Reported on 4/2/2024)      HYDROcodone-acetaminophen (NORCO)  MG per tablet Take 1 tablet by mouth Every 6 (Six) Hours As Needed. (Patient not taking: Reported on 4/2/2024)      ipratropium (ATROVENT) 0.06 % nasal spray 2 sprays into the nostril(s) as directed by provider. (Patient not taking: Reported on 4/2/2024)      lactulose (CHRONULAC) 10 GM/15ML solution Take 30 mL by mouth Every Night. (Patient not taking: Reported on 4/2/2024)      lisinopril (PRINIVIL,ZESTRIL) 20 MG tablet Take 1 tablet by mouth Daily.      omeprazole (priLOSEC) 20 MG capsule Take 1 capsule by mouth Daily. (Patient not  "taking: Reported on 4/2/2024) 30 capsule 1    polyethylene glycol (MIRALAX) 17 g packet Take 17 g by mouth Daily. (Patient not taking: Reported on 4/2/2024) 17 packet 0    potassium chloride (MICRO-K) 10 MEQ CR capsule  (Patient not taking: Reported on 4/2/2024)      pravastatin (PRAVACHOL) 40 MG tablet Take 1 tablet by mouth Every Other Day.      rOPINIRole (REQUIP) 1 MG tablet Take 2 tablets by mouth Every Morning.       No current facility-administered medications for this visit.       Allergies   Allergen Reactions    Azithromycin Swelling and Other (See Comments)       Vitals:   /76 (BP Location: Right arm, Patient Position: Sitting, Cuff Size: Adult)   Pulse 82   Ht 160 cm (62.99\")   Wt 90.7 kg (200 lb)   LMP  (LMP Unknown)   SpO2 94%   BMI 35.44 kg/m²           Visit Vitals  /76 (BP Location: Right arm, Patient Position: Sitting, Cuff Size: Adult)   Pulse 82   Ht 160 cm (62.99\")   Wt 90.7 kg (200 lb)   LMP  (LMP Unknown)   SpO2 94%   BMI 35.44 kg/m²     78 y.o.female     Review of Systems   Constitutional:  Positive for activity change. Negative for fever.   Respiratory:  Negative for shortness of breath and wheezing.    Cardiovascular:  Negative for chest pain.   Musculoskeletal:  Positive for arthralgias, back pain, gait problem, joint swelling and myalgias.   Skin:  Negative for color change and wound.   Neurological:  Positive for weakness. Negative for numbness.       Physical Exam  Vitals and nursing note reviewed.   Constitutional:       General: She is not in acute distress.     Appearance: Normal appearance.   Pulmonary:      Effort: Pulmonary effort is normal. No respiratory distress.   Skin:     General: Skin is warm and dry.      Findings: No erythema.   Neurological:      General: No focal deficit present.      Mental Status: She is alert.      Sensory: No sensory deficit.      Motor: No weakness.      Gait: Gait abnormal.       Physical Exam  Pedal pulses are present, difficult " to palpate bilaterally but intact. There is 1+ below the knee edema with mild pitting bilaterally, varicose venous changes and large bunion bilaterally. A scar is present on the left knee from previous total knee arthroplasty with no dehiscence or sign of infection. The left knee flexes to around 110 degrees with a few degrees of extension deficit but no pain. Laxity on varus stress test is observed. Strength in the knee and left ankle is intact. Lorie test is negative. There is some laxity in the replaced left knee joint. The right knee is nontender, no effusion, with a few degrees of extension deficits. Flexion is 110 degrees with significant crepitus. Varus and valgus stress tests are negative. Lorie test is negative. Seated straight leg raise test is negative bilaterally. Deep tendon reflexes are 1+ patellar bilaterally, 2+ right Achilles, 1+ left Achilles which is difficult to elicit. Tenderness is present in the lower lumbar spine at L4, L5-S1 and bilateral SI joints. Strength of both hips, both knees, and both ankles is intact. Heel-to-shin coordination is intact. Salguero test is negative. The patient is examined in wheelchair which limits exam. The patient experiences pain when lying flat. Mild positive bilateral supine straight leg raise is observed. Thigh thrust test is positive for SI pain.    Radiology Results:    CT outside films  This procedure was auto-finalized with no dictation required.  MR:  LUMBAR SPINE WITHOUT IV CONTRAST [id number]-- 2/6/2024 4:07 PM     CLINICAL HISTORY:  Chronic low-back pain and bilateral leg pain. No   recent trauma.  Degenerative disc disease, lumbar   COMPARISON: Lumbar MRI September 2020   CONTRAST:  None   TECHNIQUE: Non-contrasted Sagittal T1W, STIR, T2W; axial T1W, T2W     FINDINGS:  The conus medullaris extends to the level of L1-L2. The   imaged lower thoracic cord and conus medullaris have normal signal. No   circumferential compression although the spinal canal  is narrowed at the   disc level of T10-T11 due to disc protrusions and ligamentum flavum   hypertrophy.     Persistent increased marrow signal within the L1-L2 vertebra although   intensity is decreased. Reactive edema is likely etiology. Severe   degenerative disc narrowing and endplate remodeling at L1-2, L2-3 and   posteriorly at L3-4. Moderate reactive changes at L4-5 and L5-S1. Severe   foraminal stenosis at L1-2, L2-3 on the left with no definable   perineural fat and probable compression of the left L1 and L2 nerve   roots of multifactorial etiology. Marked right L3-4, L4-5, L5-S1   foraminal narrowing also of multifactorial etiology but without   obliteration of the perineural fat. Prominent spinal canal narrowing of   multifactorial etiology at each disc level with clumping of the nerve   roots in the thecal sac. Circumferential compression of the thecal sac   at L4-5 due to disc protrusion, anterior L4 subluxation and bilateral   facet and ligamentum flavum hypertrophic changes. Bilateral prominent   lateral recess stenosis at L5-S1 due to posterior vertebral spur and   slight retrolisthesis of L5 on S1. No fracture. Retroperitoneal   structures are stable. Dominant cyst involving the right kidney that   measures greater than 76 mm is noted. The imaged left kidney is   unremarkable. Aorta is normal size.       Multilevel disc desiccation, disc narrowing, endplate irregularity   within the imaged lower thoracic spine and diffusely throughout the   lumbar spine. Stable anterolisthesis of L4 with respect to L5. MRI   findings consistent with dextroscoliosis centered at L2. Progression of   intervertebral disc narrowing at L2-L3, L4-L5.     Results  Imaging  X-ray of right knee shows severe degenerative arthritis. X-ray of back shows scoliosis, degenerative changes at all levels of the lumbar spine and the lower thoracics, foraminal stenosis, and a big disc at L4-5.     - Large Joint Arthrocentesis: R knee on  6/17/2024 1:30 PM  Indications: pain, joint swelling and diagnostic evaluation  Details: 22 G needle, anterolateral approach  Medications: 5 mL lidocaine PF 1% 1 %; 80 mg methylPREDNISolone acetate 80 MG/ML  Outcome: tolerated well, no immediate complications    Injection site on lateral knee was cleaned with alcohol and iodine and anesthesia provided with ethyl chloride.  Then using 22-gauge 1.5 inch needle the knee joint was accessed and a mixture of 5 cc of 1% lidocaine and 80 mg of methylprednisolone were injected.  Area was covered with sterile bandage.  Follow-up care precautions were discussed.  There were no adverse effects and negligible blood loss.       Procedure, treatment alternatives, risks and benefits explained, specific risks discussed. Consent was given by the patient. Immediately prior to procedure a time out was called to verify the correct patient, procedure, equipment, support staff and site/side marked as required. Patient was prepped and draped in the usual sterile fashion.        Diagnoses and all orders for this visit:    1. Bilateral chronic knee pain (Primary)  -     XR Knee 3 View Bilateral  -     Diclofenac Sodium (VOLTAREN) 1 % gel gel; Apply 4 g topically to the appropriate area as directed 4 (Four) Times a Day As Needed (4g to each knee).  Dispense: 350 g; Refill: 2    2. Primary localized osteoarthrosis of right lower leg  -     XR Knee 3 View Bilateral  -     Diclofenac Sodium (VOLTAREN) 1 % gel gel; Apply 4 g topically to the appropriate area as directed 4 (Four) Times a Day As Needed (4g to each knee).  Dispense: 350 g; Refill: 2    3. Gastroesophageal reflux disease with esophagitis, unspecified whether hemorrhage    4. Bray's esophagus with dysplasia    5. Poor tolerance for ambulation    6. Complaints of leg weakness    Other orders  -     - Large Joint Arthrocentesis        Assessment & Plan  1. Bilateral knee arthritis.  The patient's knee condition necessitates a knee  replacement. Additionally, she has scoliosis, a compression fracture, degenerative changes at all levels of the lumbar spine and the lower thoracic spine, foraminal stenosis, and a significant disc at L4-5. The patient is advised to bring a disc from [place] during her next visit. A nerve conduction study will be ordered. A referral for physical therapy will be made. The patient is advised to use NSAID cream on her knees and back. Steroid injections in her knee will be administered today. A referral to pain management will be made for pain injections in the back. The patient is advised to perform daily exercises on both legs. A prescription for Voltaren gel will be sent, to be applied on both knees and back four times daily. A knee brace will be provided for extra support. Post-injection instructions include not submerging the area under water for 24 hours, changing the Band-Aid if necessary, and removing the Band-Aid after 24 hours.        Discussion:  I am ordering custom hinged knee braces for both knees which will benefit the patient and help with ambulation      MEDS ORDERED DURING VISIT:  No orders of the defined types were placed in this encounter.    MEDICATION ISSUES:  Discussed medication options and treatment plan of prescribed medication as well as the risks, benefits, and side effects including potential falls, possible impaired driving and metabolic adversities among others. Patient is agreeable to call the office with any worsening of symptoms or onset of side effects. Patient is agreeable to call 911 or go to the nearest ER should he/she begin having SI/HI.         06/17/24   13:22 EDT    Patient or patient representative verbalized consent for the use of Ambient Listening during the visit with  Dragan Rogers DO for chart documentation. 6/23/2024  23:54 EDT

## 2024-06-17 NOTE — PROGRESS NOTES
June 17, 2024    Hello, may I speak with Layne Case?    My name is AMBER     I am  with [facility]  [address]  [facility] [address] [phone].    Before we get started may I verify your date of birth? 1945    I am calling to officially welcome you to our practice and ask about your recent visit. Is this a good time to talk? yes    Tell me about your visit with us. What things went well?  EVERYTHING WENT WELL.  NICE PROVIDER AND STAFF.       We're always looking for ways to make our patients' experiences even better. Do you have recommendations on ways we may improve?  no    Overall were you satisfied with your first visit to our practice? yes       I appreciate you taking the time to speak with me today. Is there anything else I can do for you? no      Thank you, and have a great day.

## 2024-06-23 RX ORDER — METHYLPREDNISOLONE ACETATE 80 MG/ML
80 INJECTION, SUSPENSION INTRA-ARTICULAR; INTRALESIONAL; INTRAMUSCULAR; SOFT TISSUE
Status: COMPLETED | OUTPATIENT
Start: 2024-06-17 | End: 2024-06-17

## 2024-06-23 RX ORDER — LIDOCAINE HYDROCHLORIDE 10 MG/ML
5 INJECTION, SOLUTION EPIDURAL; INFILTRATION; INTRACAUDAL; PERINEURAL
Status: COMPLETED | OUTPATIENT
Start: 2024-06-17 | End: 2024-06-17

## 2024-07-01 ENCOUNTER — OFFICE VISIT (OUTPATIENT)
Dept: ORTHOPEDIC SURGERY | Facility: CLINIC | Age: 79
End: 2024-07-01
Payer: MEDICARE

## 2024-07-01 VITALS
BODY MASS INDEX: 35.44 KG/M2 | WEIGHT: 200 LBS | OXYGEN SATURATION: 96 % | HEART RATE: 87 BPM | HEIGHT: 63 IN | SYSTOLIC BLOOD PRESSURE: 122 MMHG | DIASTOLIC BLOOD PRESSURE: 66 MMHG

## 2024-07-01 DIAGNOSIS — M17.11 PRIMARY OSTEOARTHRITIS OF RIGHT KNEE: ICD-10-CM

## 2024-07-01 DIAGNOSIS — M25.561 BILATERAL CHRONIC KNEE PAIN: Primary | ICD-10-CM

## 2024-07-01 DIAGNOSIS — M54.41 CHRONIC MIDLINE LOW BACK PAIN WITH RIGHT-SIDED SCIATICA: ICD-10-CM

## 2024-07-01 DIAGNOSIS — M17.11 PRIMARY LOCALIZED OSTEOARTHROSIS OF RIGHT LOWER LEG: ICD-10-CM

## 2024-07-01 DIAGNOSIS — M46.1 SACROILIITIS: ICD-10-CM

## 2024-07-01 DIAGNOSIS — G89.29 BILATERAL CHRONIC KNEE PAIN: Primary | ICD-10-CM

## 2024-07-01 DIAGNOSIS — M25.562 BILATERAL CHRONIC KNEE PAIN: Primary | ICD-10-CM

## 2024-07-01 DIAGNOSIS — G89.29 CHRONIC MIDLINE LOW BACK PAIN WITH RIGHT-SIDED SCIATICA: ICD-10-CM

## 2024-07-01 PROCEDURE — 99213 OFFICE O/P EST LOW 20 MIN: CPT | Performed by: FAMILY MEDICINE

## 2024-07-01 NOTE — PROGRESS NOTES
"Follow Up Visit      Patient: Layne Case  YOB: 1945  Date of Encounter: 07/01/2024  PCP: My Franco MD  Referring Provider: No ref. provider found     Subjective   Layne Case is a 78 y.o. female who presents to the office today for evaluation of Pain and Follow-up of the Left Knee and Pain and Follow-up of the Right Knee      Chief Complaint   Patient presents with    Left Knee - Pain, Follow-up    Right Knee - Pain, Follow-up       HPI  Patient complains of ongoing bilateral knee pain that is essentially unchanged from the previous exam continues to be worse on the right.  Started PT and has had 2 visits and cannot tell any difference yet.  Has not gotten her knee braces yet.  Cannot tell that the steroid injection helped her right knee at all but also had no adverse effects.  Continues to have significant right-sided leg pain shooting up to the back.  Due to see heme-onc for treatment for iron deficiency anemia  Patient Active Problem List   Diagnosis    Abnormal mammogram    Umbilical hernia    Umbilical hernia without obstruction or gangrene    Anemia    Renal mass    Bray's esophagus with dysplasia    Gastroesophageal reflux disease with esophagitis       Past Medical History:   Diagnosis Date    Abnormal mammogram     Anxiety     Arthritis     Bray esophagus     Hypertension        Allergies   Allergen Reactions    Azithromycin Swelling and Other (See Comments)       Vitals:   /66 (BP Location: Right arm, Patient Position: Sitting, Cuff Size: Adult)   Pulse 87   Ht 160 cm (62.99\")   Wt 90.7 kg (200 lb)   LMP  (LMP Unknown)   SpO2 96%   BMI 35.44 kg/m²              Visit Vitals  /66 (BP Location: Right arm, Patient Position: Sitting, Cuff Size: Adult)   Pulse 87   Ht 160 cm (62.99\")   Wt 90.7 kg (200 lb)   LMP  (LMP Unknown)   SpO2 96%   BMI 35.44 kg/m²     78 y.o.female  Physical Exam  Vitals and nursing note reviewed.   Constitutional:       General: She is not " in acute distress.     Appearance: Normal appearance.   Pulmonary:      Effort: Pulmonary effort is normal. No respiratory distress.   Musculoskeletal:      Right knee: Decreased range of motion. Tenderness present over the medial joint line.   Skin:     General: Skin is warm and dry.      Findings: No erythema.   Neurological:      General: No focal deficit present.      Mental Status: She is alert.      Sensory: No sensory deficit.      Motor: No weakness.      Gait: Gait abnormal.     Msk: Right knee flexes to around 110 degrees with a few degrees of extension deficit but no pain. Laxity on varus stress test is observed. Strength in the knee and  is intact. Lorie test is negative.  knee is nontender, no effusion. Varus and valgus stress tests are negative. Seated straight leg raise test is negative bilaterally. Deep tendon reflexes are 1+ patellar bilaterally, 2+ right Achilles, 1+ left Achilles which is difficult to elicit. Tenderness is present in the lower lumbar spine at L4, L5-S1 and bilateral SI joints. Strength of both hips, both knees, and both ankles is intact. Salguero test is negative.  The patient experiences pain when lying flat. Mild positive bilateral supine straight leg raise is observed. Thigh thrust test is positive for SI pain.    Radiology Results:    No radiology results for the last 30 days.    Assessment & Plan   Diagnoses and all orders for this visit:    1. Bilateral chronic knee pain (Primary)    2. Primary localized osteoarthrosis of right lower leg    3. Primary osteoarthritis of right knee    4. Sacroiliitis    5. Chronic midline low back pain with right-sided sciatica         MEDS ORDERED DURING VISIT:  No orders of the defined types were placed in this encounter.    MEDICATION ISSUES:  Discussed medication options and treatment plan of prescribed medication as well as the risks, benefits, and side effects including potential falls, possible impaired driving and metabolic adversities  among others. Patient is agreeable to call the office with any worsening of symptoms or onset of side effects. Patient is agreeable to call 911 or go to the nearest ER should he/she begin having SI/HI.     Discussion:  Patient states she did not get any significant benefit from the knee injection but also states that knee pain was not her main symptom.  I am concerned that more of her issues with the weakness and pain are coming from the spine where she has significant degenerative changes.  Patient has pending referral for both an EMG as well as for pain management to discuss injections.  Patient should continue her physical therapy and see if she improves.  We would consider referral for SI joint injection.  Would also consider viscosupplementation injections although again the knee does not seem to be hurting her very much right now.  Follow-up in 1 month             This document has been electronically signed by Dragan Rogers DO   July 8, 2024 00:00 EDT    Dictated Utilizing Dragon Dictation: Part of this note may be an electronic transcription/translation of spoken language to printed text using the Dragon Dictation System.

## 2024-08-02 ENCOUNTER — OFFICE VISIT (OUTPATIENT)
Dept: ORTHOPEDIC SURGERY | Facility: CLINIC | Age: 79
End: 2024-08-02
Payer: MEDICARE

## 2024-08-02 VITALS
HEART RATE: 81 BPM | WEIGHT: 200 LBS | SYSTOLIC BLOOD PRESSURE: 159 MMHG | DIASTOLIC BLOOD PRESSURE: 87 MMHG | HEIGHT: 63 IN | BODY MASS INDEX: 35.44 KG/M2 | OXYGEN SATURATION: 97 %

## 2024-08-02 DIAGNOSIS — G89.29 CHRONIC MIDLINE LOW BACK PAIN WITH RIGHT-SIDED SCIATICA: ICD-10-CM

## 2024-08-02 DIAGNOSIS — G89.29 BILATERAL CHRONIC KNEE PAIN: Primary | ICD-10-CM

## 2024-08-02 DIAGNOSIS — M25.561 BILATERAL CHRONIC KNEE PAIN: Primary | ICD-10-CM

## 2024-08-02 DIAGNOSIS — M54.41 CHRONIC MIDLINE LOW BACK PAIN WITH RIGHT-SIDED SCIATICA: ICD-10-CM

## 2024-08-02 DIAGNOSIS — M25.562 BILATERAL CHRONIC KNEE PAIN: Primary | ICD-10-CM

## 2024-08-02 DIAGNOSIS — Z96.652 HISTORY OF LEFT KNEE REPLACEMENT: ICD-10-CM

## 2024-08-02 DIAGNOSIS — M17.11 PRIMARY OSTEOARTHRITIS OF RIGHT KNEE: ICD-10-CM

## 2024-08-02 DIAGNOSIS — M17.11 PRIMARY LOCALIZED OSTEOARTHROSIS OF RIGHT LOWER LEG: ICD-10-CM

## 2024-08-02 DIAGNOSIS — R29.898 COMPLAINTS OF LEG WEAKNESS: ICD-10-CM

## 2024-08-02 DIAGNOSIS — Z78.9 POOR TOLERANCE FOR AMBULATION: ICD-10-CM

## 2024-08-02 DIAGNOSIS — M48.062 SPINAL STENOSIS OF LUMBAR REGION WITH NEUROGENIC CLAUDICATION: ICD-10-CM

## 2024-08-02 DIAGNOSIS — M46.1 SACROILIITIS: ICD-10-CM

## 2024-08-02 DIAGNOSIS — M48.061 LUMBAR FORAMINAL STENOSIS: ICD-10-CM

## 2024-08-02 RX ORDER — LIDOCAINE HYDROCHLORIDE 10 MG/ML
5 INJECTION, SOLUTION EPIDURAL; INFILTRATION; INTRACAUDAL; PERINEURAL
Status: COMPLETED | OUTPATIENT
Start: 2024-08-02 | End: 2024-08-02

## 2024-08-02 RX ADMIN — LIDOCAINE HYDROCHLORIDE 5 ML: 10 INJECTION, SOLUTION EPIDURAL; INFILTRATION; INTRACAUDAL; PERINEURAL at 13:41

## 2024-08-02 NOTE — PROGRESS NOTES
Follow Up Visit      Patient: Layne Case  YOB: 1945  Date of Encounter: 08/02/2024  PCP: My Franco MD  Referring Provider: No ref. provider found     Subjective   Layne Case is a 78 y.o. female who presents to the office today for evaluation of Follow-up and Pain of the Left Knee and Follow-up and Pain of the Right Knee      Chief Complaint   Patient presents with    Left Knee - Follow-up, Pain    Right Knee - Follow-up, Pain       History of Present Illness    Patient presents accompanied by her  for follow-up for chronic knee pain leg pain back pain and leg weakness.  Patient states that PT is helping somewhat.  States that she is a little better when ambulating and can go a little farther and a little easier overall is still getting significant episodes of weakness and has very limited ambulatory ability requiring use of wheelchair most of the time.  States that she has no pain at the moment but gets painful when she is bearing weight.  States that the right knee only becomes painful when bearing weight.  Patient notes that she has not scheduled with pain management yet because she has had injections in the past which were of no benefit.  She has seen neurosurgery in the past but not recently and not since the most recent MRI.  Patient states she just wants to be able to ambulate better.  Notes that she had Visco injections in the knee several years ago which was the 5 shot series which was not particularly helpful.  She is scheduled for her EMG of her legs in a few weeks with Dr. Chan.  Patient Active Problem List   Diagnosis    Abnormal mammogram    Umbilical hernia    Umbilical hernia without obstruction or gangrene    Anemia    Renal mass    Bray's esophagus with dysplasia    Gastroesophageal reflux disease with esophagitis       Past Medical History:   Diagnosis Date    Abnormal mammogram     Anxiety     Arthritis     Bray esophagus     Hypertension        Allergies  "  Allergen Reactions    Azithromycin Swelling and Other (See Comments)       Vitals:   /87 (BP Location: Left arm, Patient Position: Sitting, Cuff Size: Adult)   Pulse 81   Ht 160 cm (62.99\")   Wt 90.7 kg (200 lb)   LMP  (LMP Unknown)   SpO2 97%   BMI 35.44 kg/m²          Visit Vitals  /87 (BP Location: Left arm, Patient Position: Sitting, Cuff Size: Adult)   Pulse 81   Ht 160 cm (62.99\")   Wt 90.7 kg (200 lb)   LMP  (LMP Unknown)   SpO2 97%   BMI 35.44 kg/m²     78 y.o.female        Physical Exam    Physical Exam  Vitals and nursing note reviewed.   Constitutional:       General: She is not in acute distress.     Appearance: Normal appearance.   Pulmonary:      Effort: Pulmonary effort is normal. No respiratory distress.   Musculoskeletal:      Right knee: Decreased range of motion. Tenderness present over the medial joint line.      Comments: Msk: Right knee flexes to around 95 degrees with a few degrees of extension deficit but no pain. . Strength in the knee and  is intact.mild soreness on joint line. Varus and valgus stress tests are negative.  . Deep tendon reflexes are 1+ patellar bilaterally, 2+ right Achilles, 1+ left Achilles which is difficult to elicit. Tenderness is present in the lower lumbar spine at L4, L5-S1 and bilateral SI joints. Strength of both hips, both knees, and both ankles is intact.    Skin:     General: Skin is warm and dry.      Findings: No erythema.   Neurological:      General: No focal deficit present.      Mental Status: She is alert.      Sensory: No sensory deficit.      Motor: No weakness.      Gait: Gait abnormal.       Radiology Results:    No radiology results for the last 30 days.    Results    - Large Joint Arthrocentesis on 8/2/2024 1:41 PM  Indications: pain  Details: 20 G needle, anterolateral approach  Medications: 5 mL lidocaine PF 1% 1 %; 88 mg Hyaluronan 88 MG/4ML  Outcome: tolerated well, no immediate complications    Injection site on the lateral " knee was cleaned with alcohol and iodine and anesthesia was provided with ethyl chloride spray.  Then 3 cc of 1% lidocaine without epinephrine was injected into the subcutaneous tissues using a 1.5 inch 27-gauge needle for anesthesia.  Then using sterile technique and a 20-gauge 1.5 inch needle the knee joint was accessed and further 2 cc of 1% lidocaine was injected to confirm access to the joint.  Using sterile technique the syringe was switched and 88mg of monovisc was injected.  The injection site was covered with sterile bandage.  There is no adverse effects and negligible  blood loss.  Follow-up care and precautions were discussed.      Consent was given by the patient. Immediately prior to procedure a time out was called to verify the correct patient, procedure, equipment, support staff and site/side marked as required. Patient was prepped and draped in the usual sterile fashion.        Assessment & Plan     Diagnoses and all orders for this visit:    1. Bilateral chronic knee pain (Primary)    2. Primary localized osteoarthrosis of right lower leg    3. Primary osteoarthritis of right knee    4. Sacroiliitis    5. Chronic midline low back pain with right-sided sciatica  -     Ambulatory Referral to Neurosurgery    6. Poor tolerance for ambulation  -     Ambulatory Referral to Neurosurgery    7. Complaints of leg weakness  -     Ambulatory Referral to Neurosurgery    8. History of left knee replacement  -     Ambulatory Referral to Neurosurgery    9. Lumbar foraminal stenosis  -     Ambulatory Referral to Neurosurgery    10. Spinal stenosis of lumbar region with neurogenic claudication  -     Ambulatory Referral to Neurosurgery    Other orders  -     Cancel: - Large Joint Arthrocentesis  -     - Large Joint Arthrocentesis          MEDS ORDERED DURING VISIT:  No orders of the defined types were placed in this encounter.    MEDICATION ISSUES:  Discussed medication options and treatment plan of prescribed  medication as well as the risks, benefits, and side effects including potential falls, possible impaired driving and metabolic adversities among others. Patient is agreeable to call the office with any worsening of symptoms or onset of side effects. Patient is agreeable to call 911 or go to the nearest ER should he/she begin having SI/HI.     Discussion:  After discussion of treatments patient is decided to go with viscosupplementation in the right knee today to try to help with knee pain during ambulation.  Previously had a partial Supartz series which was not much.  Received single dose Monovisc today.  Has an upcoming EMG scheduled .  Patient has had spine injections before which were not any help and is reluctant to go back for more.  PT is providing mild improvements.  Patient is referred to neruology and neurosurgery for another eval to see if intervention is needed for the severe spinal stenosis which is likely factor in her leg weakness.  Follow-up in 1 month or sooner if needed      This document has been electronically signed by Dragan Rogers DO   August 2, 2024 13:47 EDT

## 2024-09-03 ENCOUNTER — OFFICE VISIT (OUTPATIENT)
Dept: ORTHOPEDIC SURGERY | Facility: CLINIC | Age: 79
End: 2024-09-03
Payer: MEDICARE

## 2024-09-03 VITALS
SYSTOLIC BLOOD PRESSURE: 120 MMHG | DIASTOLIC BLOOD PRESSURE: 76 MMHG | WEIGHT: 200 LBS | OXYGEN SATURATION: 97 % | HEART RATE: 84 BPM | HEIGHT: 63 IN | BODY MASS INDEX: 35.44 KG/M2

## 2024-09-03 DIAGNOSIS — G89.29 CHRONIC MIDLINE LOW BACK PAIN WITH RIGHT-SIDED SCIATICA: ICD-10-CM

## 2024-09-03 DIAGNOSIS — Z78.9 POOR TOLERANCE FOR AMBULATION: ICD-10-CM

## 2024-09-03 DIAGNOSIS — M25.561 BILATERAL CHRONIC KNEE PAIN: ICD-10-CM

## 2024-09-03 DIAGNOSIS — G89.29 BILATERAL CHRONIC KNEE PAIN: ICD-10-CM

## 2024-09-03 DIAGNOSIS — M54.41 CHRONIC MIDLINE LOW BACK PAIN WITH RIGHT-SIDED SCIATICA: ICD-10-CM

## 2024-09-03 DIAGNOSIS — M17.11 PRIMARY LOCALIZED OSTEOARTHROSIS OF RIGHT LOWER LEG: ICD-10-CM

## 2024-09-03 DIAGNOSIS — M17.11 PRIMARY OSTEOARTHRITIS OF RIGHT KNEE: Primary | ICD-10-CM

## 2024-09-03 DIAGNOSIS — M25.562 BILATERAL CHRONIC KNEE PAIN: ICD-10-CM

## 2024-09-03 DIAGNOSIS — M46.1 SACROILIITIS: ICD-10-CM

## 2024-09-03 NOTE — PROGRESS NOTES
"Follow Up Visit      Patient: Layne Case  YOB: 1945  Date of Encounter: 09/03/2024  PCP: My Franco MD  Referring Provider: No ref. provider found     Subjective   Layne Case is a 78 y.o. female who presents to the office today for evaluation of Follow-up and Pain of the Left Knee and Follow-up and Pain of the Right Knee      Chief Complaint   Patient presents with    Left Knee - Follow-up, Pain    Right Knee - Follow-up, Pain       HPI  Patient presents for follow-up for chronic knee and back pain.  Had a viscosupplementation injection in the right knee at the last visit and notes no significant improvement or change.  Still has knee pain and significant difficulty walking due to back issues.  Patient is due to see neurosurgery in a few weeks for evaluation.  Notes that her previous surgeon did not want to replace her right knee due to the back issues and she is not interested in going back to him.  Patient brought image disc for me to review.  She states that she discontinued PT because it has not been helpful and she wanted to save some for her after a potential surgery  Patient Active Problem List   Diagnosis    Abnormal mammogram    Umbilical hernia    Umbilical hernia without obstruction or gangrene    Anemia    Renal mass    Bray's esophagus with dysplasia    Gastroesophageal reflux disease with esophagitis       Past Medical History:   Diagnosis Date    Abnormal mammogram     Anxiety     Arthritis     Bray esophagus     Hypertension        Allergies   Allergen Reactions    Azithromycin Swelling and Other (See Comments)       Vitals:   /76 (BP Location: Left arm, Patient Position: Sitting, Cuff Size: Adult)   Pulse 84   Ht 160 cm (62.99\")   Wt 90.7 kg (200 lb)   LMP  (LMP Unknown)   SpO2 97%   BMI 35.44 kg/m²               Visit Vitals  /76 (BP Location: Left arm, Patient Position: Sitting, Cuff Size: Adult)   Pulse 84   Ht 160 cm (62.99\")   Wt 90.7 kg (200 lb) "   LMP  (LMP Unknown)   SpO2 97%   BMI 35.44 kg/m²     78 y.o.female  Physical Exam  Vitals and nursing note reviewed.   Constitutional:       General: She is not in acute distress.     Appearance: Normal appearance.   Pulmonary:      Effort: Pulmonary effort is normal. No respiratory distress.   Musculoskeletal:      Lumbar back: Spasms, tenderness and bony tenderness present. Decreased range of motion. Scoliosis present.      Right knee: Decreased range of motion. Tenderness present over the medial joint line.      Comments: Msk: Right knee flexes to around 95 degrees with 3-5 degrees of extension deficit but no pain. . Strength in the knee and  is intact.mild soreness on joint line. Varus and valgus stress tests are negative.  . Deep tendon reflexes are 1+ patellar bilaterally, 2+ right Achilles, 1+ left Achilles which is difficult to elicit.  No signs of infection.    Tenderness is present in the lower lumbar spine at L4, L5-S1 and bilateral SI joints. Strength of both hips, both knees, and both ankles is intact.    Skin:     General: Skin is warm and dry.      Findings: No erythema.   Neurological:      General: No focal deficit present.      Mental Status: She is alert.      Sensory: No sensory deficit.      Motor: No weakness.      Gait: Gait abnormal.     No significant change in exam from previous visit    Radiology Results:    No radiology results for the last 30 days.  Personally reviewed knee and spine images on disc.  Severe medial joint loss on the right knee with osteophyte formation noted    XR RIght KNEE VISIONAIRE PROTOCOL  Order: [id number]  Narrative    This result has an attachment that is not available.  AP weightbearing lateral view of the right knee show severe arthritic  changes with complete obliteration of medial joint space bone-on-bone  condition with some bone loss regarding medial compartment.  There is a  large lateral shifting of the tibial plateau.  Loose body are present and  severe  patellofemoral joint arthritic changes.  Impression is severe osteoarthritis right knee mainly medial compartment  but tricompartmental condition.  Exam End: 01/29/24 12:01       MRI [facility] LUMBAR SPINE WO CONTRAST  Order: [id number]  Impression    Marked degenerative lumbar spondylosis with  dextroscoliosis. Most prominent foraminal stenosis is on the left at  L1-2 and L2-3. Most prominent spinal stenosis is at L4-5 level. The  areas of stenosis are multifactorial in etiology due to both bone, disc  narrowing, and disc protrusions. No compression of the cord or conus  medullaris identified. No fracture seen. Slight progression since the  comparison.      Images reviewed, interpreted, and dictated by Domenica Gomez MD  Narrative    MR:  LUMBAR SPINE WITHOUT IV CONTRAST [id number]-- 2/6/2024 4:07 PM    CLINICAL HISTORY:  Chronic low-back pain and bilateral leg pain. No  recent trauma.  Degenerative disc disease, lumbar  COMPARISON: Lumbar MRI September 2020  CONTRAST:  None  TECHNIQUE: Non-contrasted Sagittal T1W, STIR, T2W; axial T1W, T2W    FINDINGS:  The conus medullaris extends to the level of L1-L2. The  imaged lower thoracic cord and conus medullaris have normal signal. No  circumferential compression although the spinal canal is narrowed at the  disc level of T10-T11 due to disc protrusions and ligamentum flavum  hypertrophy.    Persistent increased marrow signal within the L1-L2 vertebra although  intensity is decreased. Reactive edema is likely etiology. Severe  degenerative disc narrowing and endplate remodeling at L1-2, L2-3 and  posteriorly at L3-4. Moderate reactive changes at L4-5 and L5-S1. Severe  foraminal stenosis at L1-2, L2-3 on the left with no definable  perineural fat and probable compression of the left L1 and L2 nerve  roots of multifactorial etiology. Marked right L3-4, L4-5, L5-S1  foraminal narrowing also of multifactorial etiology but without  obliteration of the perineural fat. Prominent  spinal canal narrowing of  multifactorial etiology at each disc level with clumping of the nerve  roots in the thecal sac. Circumferential compression of the thecal sac  at L4-5 due to disc protrusion, anterior L4 subluxation and bilateral  facet and ligamentum flavum hypertrophic changes. Bilateral prominent  lateral recess stenosis at L5-S1 due to posterior vertebral spur and  slight retrolisthesis of L5 on S1. No fracture. Retroperitoneal  structures are stable. Dominant cyst involving the right kidney that  measures greater than 76 mm is noted. The imaged left kidney is  unremarkable. Aorta is normal size.      Multilevel disc desiccation, disc narrowing, endplate irregularity  within the imaged lower thoracic spine and diffusely throughout the  lumbar spine. Stable anterolisthesis of L4 with respect to L5. MRI  findings consistent with dextroscoliosis centered at L2. Progression of  intervertebral disc narrowing at L2-L3, L4-L5.  Exam End: 02/06/24 16:38     Assessment & Plan   Diagnoses and all orders for this visit:    1. Primary osteoarthritis of right knee (Primary)    2. Bilateral chronic knee pain    3. Primary localized osteoarthrosis of right lower leg    4. Sacroiliitis    5. Chronic midline low back pain with right-sided sciatica    6. Poor tolerance for ambulation         MEDS ORDERED DURING VISIT:  No orders of the defined types were placed in this encounter.    MEDICATION ISSUES:  Discussed medication options and treatment plan of prescribed medication as well as the risks, benefits, and side effects including potential falls, possible impaired driving and metabolic adversities among others. Patient is agreeable to call the office with any worsening of symptoms or onset of side effects. Patient is agreeable to call 911 or go to the nearest ER should he/she begin having SI/HI.     Discussion:  Patient has now failed to improve with steroids or viscosupplementation injections or physical therapy on  the right knee.  The knee mainly bothers her when weightbearing or attempting to walk.  She has significant degenerative changes and would likely benefit most from a knee replacement.  Patient is referred to orthopedic surgery for evaluation for right knee replacement.  Discussed that if surgical treatment is not desired she may benefit from genicular nerve blocks.  Discussed that PRP or stem cell injections could be of benefit but would likely not be covered by her insurance.    Patient is due to see neurosurgery in 2 weeks for evaluation.  Her back pain has failed to improve with PT as well as injections in the past.  Patient states that the pain is tolerable but her biggest concern is her inability to walk and we discussed that her pain issues are likely a factor in that but it could be more related to nerve impingement.  Patient wants to be evaluated by the surgeons and then make a decision.  Follow-up in 2 months or sooner if needed    Spent greater than 30 minutes in interview exam discussion documentation review of patient's left knee, right knee and spine imaging on disc, and documentation.  Patient is being seen for management of ongoing chronic issues         This document has been electronically signed by Dragan Rogers DO   September 3, 2024 12:19 EDT    Dictated Utilizing Dragon Dictation: Part of this note may be an electronic transcription/translation of spoken language to printed text using the Dragon Dictation System.

## 2024-09-16 ENCOUNTER — TELEPHONE (OUTPATIENT)
Dept: NEUROSURGERY | Facility: CLINIC | Age: 79
End: 2024-09-16

## 2024-09-16 ENCOUNTER — OFFICE VISIT (OUTPATIENT)
Dept: NEUROSURGERY | Facility: CLINIC | Age: 79
End: 2024-09-16
Payer: MEDICARE

## 2024-09-16 VITALS
DIASTOLIC BLOOD PRESSURE: 78 MMHG | SYSTOLIC BLOOD PRESSURE: 118 MMHG | TEMPERATURE: 97.8 F | BODY MASS INDEX: 39.39 KG/M2 | HEIGHT: 63 IN | WEIGHT: 222.3 LBS

## 2024-09-16 DIAGNOSIS — M51.36 DEGENERATIVE DISC DISEASE, LUMBAR: Primary | ICD-10-CM

## 2024-09-16 DIAGNOSIS — M54.9 CHRONIC BACK PAIN, UNSPECIFIED BACK LOCATION, UNSPECIFIED BACK PAIN LATERALITY: Primary | ICD-10-CM

## 2024-09-16 DIAGNOSIS — G89.29 CHRONIC BACK PAIN, UNSPECIFIED BACK LOCATION, UNSPECIFIED BACK PAIN LATERALITY: Primary | ICD-10-CM

## 2024-09-16 DIAGNOSIS — M48.062 SPINAL STENOSIS, LUMBAR REGION, WITH NEUROGENIC CLAUDICATION: ICD-10-CM

## 2024-09-16 PROBLEM — M51.369 DEGENERATIVE DISC DISEASE, LUMBAR: Status: ACTIVE | Noted: 2024-09-16

## 2024-09-16 PROCEDURE — 99204 OFFICE O/P NEW MOD 45 MIN: CPT

## 2024-09-16 PROCEDURE — 1159F MED LIST DOCD IN RCRD: CPT

## 2024-09-16 PROCEDURE — 1160F RVW MEDS BY RX/DR IN RCRD: CPT

## 2024-09-16 RX ORDER — OMEPRAZOLE 40 MG/1
CAPSULE, DELAYED RELEASE ORAL
COMMUNITY
Start: 2024-09-14

## 2024-09-18 ENCOUNTER — TELEPHONE (OUTPATIENT)
Dept: UROLOGY | Facility: CLINIC | Age: 79
End: 2024-09-18
Payer: COMMERCIAL

## 2024-09-20 DIAGNOSIS — N28.89 RENAL MASS: Primary | ICD-10-CM

## 2024-09-23 ENCOUNTER — TELEPHONE (OUTPATIENT)
Dept: UROLOGY | Facility: CLINIC | Age: 79
End: 2024-09-23

## 2024-09-23 NOTE — TELEPHONE ENCOUNTER
Caller: NICHOLE VILCHIS    Relationship: SELF    Best call back number: [phone]     What orders are you requesting (i.e. lab or imaging): IMAGING    In what timeframe would the patient need to come in: ASAP    Where will you receive your lab/imaging services: [facility]    Additional notes: PT IS WANTING TO HAVE MRI DONE AT [facility] IN [place].

## 2024-10-17 ENCOUNTER — TELEPHONE (OUTPATIENT)
Dept: ORTHOPEDIC SURGERY | Facility: CLINIC | Age: 79
End: 2024-10-17
Payer: MEDICARE

## 2024-10-17 NOTE — TELEPHONE ENCOUNTER
Called and informed the patient Dr. Rogers wanted her to go and be evaluated. She verbalized understanding.

## 2024-10-17 NOTE — TELEPHONE ENCOUNTER
Caller: NICHOLE    Relationship: SELF    Best call back number: [phone]    What is the best time to reach you: ANY    Who are you requesting to speak with (clinical staff, provider,  specific staff member): CLINICAL    What was the call regarding: SHE WAS REFERRED TO NEUROLOGY- WOULD LIKE TO KNOW WHY SHE WAS REFERRED- SHE HAS AN APPT TODAY AT 3:00- PLEASE CALL

## 2024-10-21 ENCOUNTER — TRANSCRIBE ORDERS (OUTPATIENT)
Dept: ADMINISTRATIVE | Facility: HOSPITAL | Age: 79
End: 2024-10-21
Payer: MEDICARE

## 2024-10-21 DIAGNOSIS — R29.898 DEFICIENCIES OF LIMBS: Primary | ICD-10-CM

## 2024-10-23 ENCOUNTER — OFFICE VISIT (OUTPATIENT)
Dept: UROLOGY | Facility: CLINIC | Age: 79
End: 2024-10-23
Payer: MEDICARE

## 2024-10-23 DIAGNOSIS — N28.89 RENAL MASS: Primary | ICD-10-CM

## 2024-10-23 PROCEDURE — 99214 OFFICE O/P EST MOD 30 MIN: CPT | Performed by: UROLOGY

## 2024-10-23 PROCEDURE — 1160F RVW MEDS BY RX/DR IN RCRD: CPT | Performed by: UROLOGY

## 2024-10-23 PROCEDURE — 1159F MED LIST DOCD IN RCRD: CPT | Performed by: UROLOGY

## 2024-10-23 RX ORDER — HYDROXYZINE PAMOATE 50 MG/1
CAPSULE ORAL
COMMUNITY
Start: 2024-10-01

## 2024-10-23 NOTE — PROGRESS NOTES
Renal Mass Office Visit      Patient Name: Layne Case  : 1945   MRN: [id number]     Chief Complaint:  Renal Mass.   Chief Complaint   Patient presents with    Renal mass         History of Present Illness: Layne Case is a 79 y.o. female who presents today with recently diagnosed renal mass.  Patient has known approximately 2 cm left renal lesion which we are monitoring with surveillance.  Returns today for next 6-month follow-up imaging with MRI.  She denies any changes to health.      Subjective      Review of System: Review of Systems   Genitourinary:  Negative for decreased urine volume, difficulty urinating, dysuria, enuresis, flank pain, frequency, hematuria and urgency.      I have reviewed the ROS documented by my clinical staff, I have updated appropriately and I agree. Alonso Eckert MD    Past Medical History:   Past Medical History:   Diagnosis Date    Abnormal mammogram     Anxiety     Arthritis     Bray esophagus     Hypertension     Low back pain        Past Surgical History:   Past Surgical History:   Procedure Laterality Date    COLONOSCOPY N/A 2023    Procedure: COLONOSCOPY;  Surgeon: Jatin Rosen MD;  Location: [facility];  Service: Gastroenterology;  Laterality: N/A;    ENDOSCOPY N/A 2023    Procedure: ESOPHAGOGASTRODUODENOSCOPY;  Surgeon: Jatin Rosen MD;  Location: [facility] OR;  Service: Gastroenterology;  Laterality: N/A;    ENDOSCOPY AND COLONOSCOPY  2017    HYSTERECTOMY      TOTAL KNEE ARTHROPLASTY Left     VENTRAL/INCISIONAL HERNIA REPAIR N/A 2017    Procedure: VENTRAL/INCISIONAL HERNIA REPAIR LAPAROSCOPIC WITH PATCH;  Surgeon: Jf Gorman MD;  Location: [facility];  Service:        Family History:   Family History   Problem Relation Age of Onset    Breast cancer Maternal Grandmother        Social History:   Social History     Socioeconomic History    Marital status:    Tobacco Use    Smoking status: Never     Passive  exposure: Never    Smokeless tobacco: Never   Vaping Use    Vaping status: Never Used   Substance and Sexual Activity    Alcohol use: No    Drug use: No    Sexual activity: Defer       Medications:     Current Outpatient Medications:     alendronate (FOSAMAX) 70 MG tablet, Take 1 tablet by mouth Every 7 (Seven) Days., Disp: , Rfl:     Cholecalciferol 25 MCG (1000 UT) capsule, Take 1 capsule by mouth Daily., Disp: , Rfl:     Diclofenac Sodium (VOLTAREN) 1 % gel gel, Apply 4 g topically to the appropriate area as directed 4 (Four) Times a Day As Needed (4g to each knee)., Disp: 350 g, Rfl: 2    DULoxetine (CYMBALTA) 60 MG capsule, , Disp: , Rfl:     FeroSul 325 (65 Fe) MG tablet, , Disp: , Rfl:     folic acid (FOLVITE) 1 MG tablet, Daily., Disp: , Rfl:     furosemide (LASIX) 40 MG tablet, Take 1 tablet by mouth Daily., Disp: , Rfl:     HYDROcodone-acetaminophen (NORCO)  MG per tablet, Take 1 tablet by mouth Every 6 (Six) Hours As Needed., Disp: , Rfl:     hydrOXYzine pamoate (VISTARIL) 50 MG capsule, , Disp: , Rfl:     lisinopril (PRINIVIL,ZESTRIL) 20 MG tablet, Take 1 tablet by mouth Daily., Disp: , Rfl:     omeprazole (priLOSEC) 40 MG capsule, , Disp: , Rfl:     polyethylene glycol (MIRALAX) 17 g packet, Take 17 g by mouth Daily., Disp: 17 packet, Rfl: 0    Polysaccharide Iron Complex (FERREX 150 PO), Take  by mouth., Disp: , Rfl:     pravastatin (PRAVACHOL) 40 MG tablet, Take 1 tablet by mouth Every Other Day., Disp: , Rfl:     rOPINIRole (REQUIP) 1 MG tablet, Take 2 tablets by mouth Every Morning., Disp: , Rfl:     Allergies:   Allergies   Allergen Reactions    Azithromycin Swelling and Other (See Comments)     Bladder & Bowel Symptom Questionnaire    How often do you usually urinate during the day ?   1 - About every 3-4 hours   2.   How many timed do you urinate at night?   1 - 2 times at night   3.   What is the reason that you usually urinate?   3 - Severe urge (can delay less than 10 min)   4.   Once  "you get the urge to go, how long can you     comfortably delay?   4 - Must go immediately    5.   How often do you get a sudden urge that makes you rush to the bathroom?   4 - At least once a day   6.   How often does a sudden urge to urinate result in you leaking urine or wetting pads?   4 - At least once a day   7.  In your opinion, how good is your bladder control?   3 - Poor   8.  Do you have accidental bowel leakage?   yes   9.  Do you have difficulty fully emptying your bladder?   no   10.  Do you experience accidental leakage when performing some physical activity such as coughing, sneezing, laughing or exercise?   yes   11. Have you tried medications to help improve your symptoms?   yes   12. Would you be interested in learning about a long-lasting option that may help you with your symptoms?   no                                                                             Total Score   12     0-7 (Mild) 8-16 (Moderate) 17-28 (Severe)       Objective     Physical Exam:   Vital Signs: There were no vitals filed for this visit.  There is no height or weight on file to calculate BMI.     Physical Exam  Vitals and nursing note reviewed.   Constitutional:       Appearance: Normal appearance.   HENT:      Head: Normocephalic and atraumatic.   Cardiovascular:      Comments: Well perfused  Pulmonary:      Effort: Pulmonary effort is normal.   Abdominal:      General: Abdomen is flat.      Palpations: Abdomen is soft.   Musculoskeletal:         General: Normal range of motion.   Skin:     General: Skin is warm and dry.   Neurological:      General: No focal deficit present.      Mental Status: She is alert and oriented to person, place, and time. Mental status is at baseline.   Psychiatric:         Mood and Affect: Mood normal.         Behavior: Behavior normal.         Thought Content: Thought content normal.         Judgment: Judgment normal.             Labs:   No results found for: \"PSA\"    Lab Results   Component " Value Date    WBC 6.14 07/28/2023    HGB 9.7 (L) 07/28/2023    HCT 32.4 (L) 07/28/2023    MCV 95.3 07/28/2023     07/28/2023       Lab Results   Component Value Date    GLUCOSE 80 07/28/2023    BUN 20 07/28/2023    CREATININE 0.83 07/28/2023    EGFRIFNONA 65 06/07/2017    BCR 24.1 07/28/2023    K 4.4 07/28/2023    CO2 24.8 07/28/2023    CALCIUM 8.9 07/28/2023    ALBUMIN 3.5 06/27/2023    AST 14 06/27/2023    ALT 8 06/27/2023       Images:   MRI Angiogram Abdomen With & Without Contrast    Result Date: 10/4/2024  Solid enhancing 2.5 cm left renal mass consistent with carcinoma. Images reviewed, interpreted and dictated by Dr. Thierry Astorga MD      Measures:   Tobacco:   Layne Case  reports that she has never smoked. She has never been exposed to tobacco smoke. She has never used smokeless tobacco. I have educated her on the risk of diseases from using tobacco products         Urine Incontinence: ( NOUI)  Patient reports that she is not currently experiencing any symptoms of urinary incontinence.       Assessment / Plan      Assessment:   Layne Case is a 79 y.o. female who was recently diagnosed with a renal mass. Seen today for follow up with 6-month surveillance imaging for known 2 cm left mildly enhancing renal lesion concerning for possible renal mass.  We have previously discussed in depth management of small renal lesion, recommended surveillance given patient age and additional medical comorbidities.  Patient in agreement.  She presents today for 6 months imaging with demonstrate stability in size of lesion, continued 2 cm size, no change in characteristic or size.  We have again recommended continued surveillance given her medical comorbidities.  She is in agreement, declines desire for any further aggressive operative intervention.  We will continue to monitor if increasing kinetic is demonstrated we may further consider management.  She is understanding agreeable.  She will follow-up in 6 months  with MRI for further characterization..      Diagnoses and all orders for this visit:    1. Renal mass (Primary)  -     MRI Abdomen With & Without Contrast; Future       Follow Up:   Return in about 6 months (around 4/23/2025) for Recheck, Follow up after Imaging.    I spent approximately 30 minutes providing clinical care for this patient; including review of patient's chart and provider documentation, face to face time spent with patient in examination room (obtaining history, performing physical exam, discussing diagnosis and management options), placing orders, and completing patient documentation.     Alonso Eckert MD  [facility]

## 2024-11-05 ENCOUNTER — HOSPITAL ENCOUNTER (OUTPATIENT)
Dept: MRI IMAGING | Facility: HOSPITAL | Age: 79
Discharge: HOME | End: 2024-11-05
Admitting: PSYCHIATRY & NEUROLOGY
Payer: MEDICARE

## 2024-11-05 DIAGNOSIS — R29.898 DEFICIENCIES OF LIMBS: ICD-10-CM

## 2024-11-05 PROCEDURE — 70551 MRI BRAIN STEM W/O DYE: CPT

## 2024-11-05 PROCEDURE — 70551 MRI BRAIN STEM W/O DYE: CPT | Performed by: RADIOLOGY

## 2025-03-10 ENCOUNTER — TELEPHONE (OUTPATIENT)
Dept: UROLOGY | Facility: CLINIC | Age: 80
End: 2025-03-10

## 2025-03-10 NOTE — TELEPHONE ENCOUNTER
Caller: Layne Case    Relationship: Self    Best call back number: [phone]    What orders are you requesting (i.e. lab or imaging): MRI    In what timeframe would the patient need to come in: BEFORE APPT ON 4/23/25    Where will you receive your lab/imaging services: [facility] IN [place], [place]    Additional notes: WOULD LIKE A CALL ONCE SENT, OK TO LEAVE A VM

## 2025-03-11 NOTE — TELEPHONE ENCOUNTER
PT CALLED BACK SAID SHE SPOKE WITH [facility] AND THEY HAVE NOT RECEIVED ORDERS, WILL NEED TO RE-FAX

## 2025-03-20 ENCOUNTER — TELEPHONE (OUTPATIENT)
Dept: SURGERY | Facility: CLINIC | Age: 80
End: 2025-03-20

## 2025-03-20 NOTE — TELEPHONE ENCOUNTER
Caller: NICHOLE VILCHIS    Relationship:  SELF    Best call back number: [phone]    PATIENT CALLED REQUESTING TO CANCEL SAME DAY APPT.    Did the patient call AFTER the start time of their scheduled appointment?  []YES  [x]NO    Was the patient's appointment rescheduled? [x]YES  []NO    Any additional information: PT [facility] TO 4-30-25 IN [place]. PT CHOICE. OFFERED EARLIER APPT

## 2025-04-23 ENCOUNTER — OFFICE VISIT (OUTPATIENT)
Dept: UROLOGY | Facility: CLINIC | Age: 80
End: 2025-04-23
Payer: MEDICARE

## 2025-04-23 VITALS — WEIGHT: 215 LBS | BODY MASS INDEX: 38.09 KG/M2 | HEIGHT: 63 IN

## 2025-04-23 DIAGNOSIS — N28.89 RENAL MASS: Primary | ICD-10-CM

## 2025-04-23 PROCEDURE — 1159F MED LIST DOCD IN RCRD: CPT | Performed by: UROLOGY

## 2025-04-23 PROCEDURE — 1160F RVW MEDS BY RX/DR IN RCRD: CPT | Performed by: UROLOGY

## 2025-04-23 PROCEDURE — 99214 OFFICE O/P EST MOD 30 MIN: CPT | Performed by: UROLOGY

## 2025-04-23 NOTE — PROGRESS NOTES
Follow Up Office Visit      Patient Name: Layne Case  : 1945   MRN: [id number]     Chief Complaint:    Chief Complaint   Patient presents with    Renal mass       History of Present Illness: Layne Case is a 79 y.o. female who presents today for 6-month surveillance imaging.  Patient has known approximately 2 cm left renal lesion which we are monitoring with surveillance.  Returns today for next 6-month follow-up imaging with MRI.  She denies any changes to health.       Subjective      Review of System: Review of Systems   Genitourinary:  Negative for decreased urine volume, difficulty urinating, dysuria, enuresis, flank pain, frequency, hematuria and urgency.      I have reviewed the ROS documented by my clinical staff, updated as appropriate and I agree. Alonso Eckert MD    I have reviewed and the following portions of the patient's history were updated as appropriate: past family history, past medical history, past social history, past surgical history and problem list.    Medications:     Current Outpatient Medications:     alendronate (FOSAMAX) 70 MG tablet, Take 1 tablet by mouth Every 7 (Seven) Days., Disp: , Rfl:     Cholecalciferol 25 MCG (1000 UT) capsule, Take 1 capsule by mouth Daily., Disp: , Rfl:     Diclofenac Sodium (VOLTAREN) 1 % gel gel, Apply 4 g topically to the appropriate area as directed 4 (Four) Times a Day As Needed (4g to each knee)., Disp: 350 g, Rfl: 2    DULoxetine (CYMBALTA) 60 MG capsule, , Disp: , Rfl:     FeroSul 325 (65 Fe) MG tablet, , Disp: , Rfl:     folic acid (FOLVITE) 1 MG tablet, Daily., Disp: , Rfl:     furosemide (LASIX) 40 MG tablet, Take 1 tablet by mouth Daily., Disp: , Rfl:     HYDROcodone-acetaminophen (NORCO)  MG per tablet, Take 1 tablet by mouth Every 6 (Six) Hours As Needed., Disp: , Rfl:     hydrOXYzine pamoate (VISTARIL) 50 MG capsule, , Disp: , Rfl:     lisinopril (PRINIVIL,ZESTRIL) 20 MG tablet, Take 1 tablet by mouth Daily., Disp: ,  "Rfl:     omeprazole (priLOSEC) 40 MG capsule, , Disp: , Rfl:     polyethylene glycol (MIRALAX) 17 g packet, Take 17 g by mouth Daily., Disp: 17 packet, Rfl: 0    Polysaccharide Iron Complex (FERREX 150 PO), Take  by mouth., Disp: , Rfl:     pravastatin (PRAVACHOL) 40 MG tablet, Take 1 tablet by mouth Every Other Day., Disp: , Rfl:     rOPINIRole (REQUIP) 1 MG tablet, Take 2 tablets by mouth Every Morning., Disp: , Rfl:     Allergies:   Allergies   Allergen Reactions    Azithromycin Swelling and Other (See Comments)     Bladder & Bowel Symptom Questionnaire    How often do you usually urinate during the day ?   2 - About every 2-3 hours    2.   How many timed do you urinate at night?   1 - 2 times at night   3.   What is the reason that you usually urinate?   3 - Severe urge (can delay less than 10 min)   4.   Once you get the urge to go, how long can you     comfortably delay?   4 - Must go immediately    5.   How often do you get a sudden urge that makes you rush to the bathroom?   4 - At least once a day   6.   How often does a sudden urge to urinate result in you leaking urine or wetting pads?   4 - At least once a day   7.  In your opinion, how good is your bladder control?   3 - Poor   8.  Do you have accidental bowel leakage?   yes   9.  Do you have difficulty fully emptying your bladder?   yes   10.  Do you experience accidental leakage when performing some physical activity such as coughing, sneezing, laughing or exercise?   yes   11. Have you tried medications to help improve your symptoms?   no   12. Would you be interested in learning about a long-lasting option that may help you with your symptoms?   no                                                                             Total Score   12     0-7 (Mild) 8-16 (Moderate) 17-28 (Severe)       Objective     Physical Exam:   Vital Signs:   Vitals:    04/23/25 1103   Weight: 97.5 kg (215 lb)   Height: 160 cm (62.99\")     Body mass index is 38.1 kg/m². "     Physical Exam  Vitals and nursing note reviewed.   Constitutional:       Appearance: Normal appearance.   HENT:      Head: Normocephalic and atraumatic.   Cardiovascular:      Comments: Well perfused  Pulmonary:      Effort: Pulmonary effort is normal.   Abdominal:      General: Abdomen is flat.      Palpations: Abdomen is soft.   Musculoskeletal:         General: Normal range of motion.   Skin:     General: Skin is warm and dry.   Neurological:      General: No focal deficit present.      Mental Status: She is alert and oriented to person, place, and time. Mental status is at baseline.   Psychiatric:         Mood and Affect: Mood normal.         Behavior: Behavior normal.         Thought Content: Thought content normal.         Judgment: Judgment normal.           Labs:   Brief Urine Lab Results       None                 Lab Results   Component Value Date    GLUCOSE 80 07/28/2023    CALCIUM 8.9 07/28/2023     07/28/2023    K 4.4 07/28/2023    CO2 24.8 07/28/2023     (H) 07/28/2023    BUN 20 07/28/2023    CREATININE 0.83 07/28/2023    EGFRIFNONA 65 06/07/2017    BCR 24.1 07/28/2023    ANIONGAP 8.2 07/28/2023       Lab Results   Component Value Date    WBC 6.14 07/28/2023    HGB 9.7 (L) 07/28/2023    HCT 32.4 (L) 07/28/2023    MCV 95.3 07/28/2023     07/28/2023       Images:   MRI Angiogram Abdomen With & Without Contrast  Result Date: 4/8/2025  The 20 mm left renal mass is not as well visualized but still has an appearance consistent with renal neoplasm and is unchanged in size. It may also have some hemorrhage within it. Multiple bilateral renal cysts. Images reviewed, interpreted, and dictated by Jason Umana MD      Measures:   Tobacco:   Layne Case  reports that she has never smoked. She has never been exposed to tobacco smoke. She has never used smokeless tobacco. I have educated her on the risk of diseases from using tobacco product           Urine Incontinence: ( NOUI)  Patient  reports that she is not currently experiencing any symptoms of urinary incontinence.     Assessment / Plan      Assessment/Plan:   79 y.o. female is seen today for follow up history of renal lesion. Seen today for follow up with 6-month surveillance imaging for known 2 cm left mildly enhancing renal lesion concerning for possible renal mass.  We have previously discussed in depth management of small renal lesion, recommended surveillance given patient age and additional medical comorbidities.  Patient in agreement.  She presents today for 6 months imaging with demonstrate stability in size of lesion, continued 2 cm size, no change in characteristic or size.  We have again recommended continued surveillance given her medical comorbidities.  She is in agreement, declines desire for any further aggressive operative intervention.  We will continue to monitor if increasing kinetic is demonstrated we may further consider management.  She is understanding agreeable.  Given stability of her multiple imaging studies she is appropriate for follow-up in 1 year for next imaging study and surveillance.  She is agreeable and understanding    Diagnoses and all orders for this visit:    1. Renal mass (Primary)  -     MRI Abdomen With & Without Contrast; Future         Follow Up:   Return in about 1 year (around 4/23/2026) for Recheck, Follow up after Imaging.     I spent approximately 30 minutes providing clinical care for this patient; including review of patient's chart and provider documentation, face to face time spent with patient in examination room (obtaining history, performing physical exam, discussing diagnosis and management options), placing orders, and completing patient documentation.     Alonso Eckert MD  [facility]

## (undated) DEVICE — HARMONIC ACE +7 LAPAROSCOPIC SHEARS ADVANCED HEMOSTASIS 5MM DIAMETER 36CM SHAFT LENGTH  FOR USE WITH GRAY HAND PIECE ONLY: Brand: HARMONIC ACE

## (undated) DEVICE — [HIGH FLOW INSUFFLATOR,  DO NOT USE IF PACKAGE IS DAMAGED,  KEEP DRY,  KEEP AWAY FROM SUNLIGHT,  PROTECT FROM HEAT AND RADIOACTIVE SOURCES.]: Brand: PNEUMOSURE

## (undated) DEVICE — HOLDER: Brand: DEROYAL

## (undated) DEVICE — ENCORE® LATEX MICRO SIZE 7, STERILE LATEX POWDER-FREE SURGICAL GLOVE: Brand: ENCORE

## (undated) DEVICE — DRSNG SURESITE WNDW 4X4.5

## (undated) DEVICE — BNDG ADHS CURAD FLX/FABRC 2X4IN STRL LF

## (undated) DEVICE — TROCAR SITE CLOSURE DEVICE: Brand: ENDO CLOSE

## (undated) DEVICE — SUT PDS 2/0 CT2 27IN VIL PDP333H

## (undated) DEVICE — ENDOPATH XCEL BLADELESS TROCARS WITH STABILITY SLEEVES: Brand: ENDOPATH XCEL

## (undated) DEVICE — PK LAP GEN 70

## (undated) DEVICE — SUT PROLN 4/0 PS2 18IN 8682G

## (undated) DEVICE — ENDOPATH XCEL UNIVERSAL TROCAR STABLILITY SLEEVES: Brand: ENDOPATH XCEL

## (undated) DEVICE — SUT PROLN 0 CT1 30IN 8424H

## (undated) DEVICE — ENDOCUT SCISSOR TIP, DISPOSABLE: Brand: RENEW

## (undated) DEVICE — 3M™ IOBAN™ 2 ANTIMICROBIAL INCISE DRAPE 6650EZ: Brand: IOBAN™ 2